# Patient Record
Sex: FEMALE | Race: WHITE
[De-identification: names, ages, dates, MRNs, and addresses within clinical notes are randomized per-mention and may not be internally consistent; named-entity substitution may affect disease eponyms.]

---

## 2017-03-17 ENCOUNTER — HOSPITAL ENCOUNTER (OUTPATIENT)
Age: 77
Discharge: HOME | End: 2017-03-17
Payer: MEDICARE

## 2017-03-17 DIAGNOSIS — D50.9: Primary | ICD-10-CM

## 2017-03-31 ENCOUNTER — HOSPITAL ENCOUNTER (OUTPATIENT)
Age: 77
Discharge: HOME | End: 2017-03-31
Payer: MEDICARE

## 2017-03-31 DIAGNOSIS — R53.83: ICD-10-CM

## 2017-03-31 DIAGNOSIS — D50.9: Primary | ICD-10-CM

## 2017-05-17 ENCOUNTER — HOSPITAL ENCOUNTER (OUTPATIENT)
Age: 77
Discharge: HOME | End: 2017-05-17
Payer: MEDICARE

## 2017-05-17 DIAGNOSIS — Z12.31: Primary | ICD-10-CM

## 2017-05-17 DIAGNOSIS — Z80.3: ICD-10-CM

## 2017-06-05 ENCOUNTER — HOSPITAL ENCOUNTER (OUTPATIENT)
Dept: HOSPITAL 76 - LAB.WCP | Age: 77
Discharge: HOME | End: 2017-06-05
Attending: FAMILY MEDICINE
Payer: MEDICARE

## 2017-06-05 DIAGNOSIS — I10: ICD-10-CM

## 2017-06-05 DIAGNOSIS — D50.9: ICD-10-CM

## 2017-06-05 DIAGNOSIS — I48.91: Primary | ICD-10-CM

## 2017-06-05 LAB
ALBUMIN/GLOB SERPL: 1.3 {RATIO} (ref 1–2.2)
ANION GAP SERPL CALCULATED.4IONS-SCNC: 8 MMOL/L (ref 6–13)
BASOPHILS NFR BLD AUTO: 0 10^3/UL (ref 0–0.1)
BASOPHILS NFR BLD AUTO: 0.7 %
BILIRUB BLD-MCNC: 0.6 MG/DL (ref 0.2–1)
BUN SERPL-MCNC: 18 MG/DL (ref 6–20)
CALCIUM UR-MCNC: 8.7 MG/DL (ref 8.5–10.3)
CHLORIDE SERPL-SCNC: 102 MMOL/L (ref 101–111)
CHOLEST SERPL-MCNC: 200 MG/DL
CO2 SERPL-SCNC: 28 MMOL/L (ref 21–32)
CREAT SERPLBLD-SCNC: 1 MG/DL (ref 0.4–1)
EOSINOPHIL # BLD AUTO: 0.1 10^3/UL (ref 0–0.7)
EOSINOPHIL NFR BLD AUTO: 3.2 %
ERYTHROCYTE [DISTWIDTH] IN BLOOD BY AUTOMATED COUNT: 16.7 % (ref 12–15)
GFRSERPLBLD MDRD-ARVRAT: 54 ML/MIN/{1.73_M2} (ref 89–?)
GLOBULIN SER-MCNC: 3.2 G/DL (ref 2.1–4.2)
GLUCOSE SERPL-MCNC: 65 MG/DL (ref 70–100)
HCT VFR BLD AUTO: 37.7 % (ref 37–47)
HDLC SERPL-MCNC: 81 MG/DL
HDLC SERPL: 2.5 {RATIO} (ref ?–4.4)
HGB UR QL STRIP: 12.3 G/DL (ref 12–16)
IRON SERPL-MCNC: 69 UG/DL (ref 28–170)
LDLC/HDLC SERPL: 1.3 {RATIO} (ref ?–4.4)
LYMPHOCYTES # SPEC AUTO: 1.1 10^3/UL (ref 1.5–3.5)
LYMPHOCYTES NFR BLD AUTO: 27.4 %
MCH RBC QN AUTO: 30 PG (ref 27–31)
MCHC RBC AUTO-ENTMCNC: 32.5 G/DL (ref 32–36)
MCV RBC AUTO: 92.2 FL (ref 81–99)
MONOCYTES # BLD AUTO: 0.4 10^3/UL (ref 0–1)
MONOCYTES NFR BLD AUTO: 9.5 %
NEUTROPHILS # BLD AUTO: 2.4 10^3/UL (ref 1.5–6.6)
NEUTROPHILS # SNV AUTO: 4 X10^3/UL (ref 4.8–10.8)
NEUTROPHILS NFR BLD AUTO: 59.2 %
NRBC # BLD AUTO: 0.2 /100WBC
PDW BLD AUTO: 9.5 FL (ref 7.9–10.8)
POTASSIUM SERPL-SCNC: 4 MMOL/L (ref 3.5–5)
PROT SPEC-MCNC: 7.5 G/DL (ref 6.7–8.2)
RBC MAR: 4.09 10^6/UL (ref 4.2–5.4)
SODIUM SERPLBLD-SCNC: 138 MMOL/L (ref 135–145)
TIBC SERPL-MCNC: 361 UG/DL (ref 250–450)
TRANSFERRIN SERPL-MCNC: 258 MG/DL (ref 192–382)
TRIGL P FAST SERPL-MCNC: 82 MG/DL
VLDLC SERPL-SCNC: 16 MG/DL
WBC # BLD: 4 X10^3/UL

## 2017-06-05 PROCEDURE — 82728 ASSAY OF FERRITIN: CPT

## 2017-06-05 PROCEDURE — 80061 LIPID PANEL: CPT

## 2017-06-05 PROCEDURE — 85025 COMPLETE CBC W/AUTO DIFF WBC: CPT

## 2017-06-05 PROCEDURE — 83540 ASSAY OF IRON: CPT

## 2017-06-05 PROCEDURE — 36415 COLL VENOUS BLD VENIPUNCTURE: CPT

## 2017-06-05 PROCEDURE — 84466 ASSAY OF TRANSFERRIN: CPT

## 2017-06-05 PROCEDURE — 80053 COMPREHEN METABOLIC PANEL: CPT

## 2017-10-19 ENCOUNTER — HOSPITAL ENCOUNTER (OUTPATIENT)
Dept: HOSPITAL 76 - LAB.WCP | Age: 77
Discharge: HOME | End: 2017-10-19
Attending: FAMILY MEDICINE
Payer: MEDICARE

## 2017-10-19 DIAGNOSIS — K92.2: Primary | ICD-10-CM

## 2017-10-19 DIAGNOSIS — Z79.01: ICD-10-CM

## 2017-10-19 LAB
BASOPHILS NFR BLD AUTO: 0.1 10^3/UL (ref 0–0.1)
BASOPHILS NFR BLD AUTO: 1.3 %
EOSINOPHIL # BLD AUTO: 0.1 10^3/UL (ref 0–0.7)
EOSINOPHIL NFR BLD AUTO: 2.2 %
ERYTHROCYTE [DISTWIDTH] IN BLOOD BY AUTOMATED COUNT: 16.1 % (ref 12–15)
HCT VFR BLD AUTO: 38.9 % (ref 37–47)
HGB UR QL STRIP: 12.7 G/DL (ref 12–16)
INR PPP: 1.8 (ref 0.8–1.2)
IRON SERPL-MCNC: 44 UG/DL (ref 28–170)
LYMPHOCYTES # SPEC AUTO: 0.8 10^3/UL (ref 1.5–3.5)
LYMPHOCYTES NFR BLD AUTO: 20.5 %
MCH RBC QN AUTO: 30.7 PG (ref 27–31)
MCHC RBC AUTO-ENTMCNC: 32.6 G/DL (ref 32–36)
MCV RBC AUTO: 94.2 FL (ref 81–99)
MONOCYTES # BLD AUTO: 0.3 10^3/UL (ref 0–1)
MONOCYTES NFR BLD AUTO: 8.2 %
NEUTROPHILS # BLD AUTO: 2.8 10^3/UL (ref 1.5–6.6)
NEUTROPHILS # SNV AUTO: 4.1 X10^3/UL (ref 4.8–10.8)
NEUTROPHILS NFR BLD AUTO: 67.8 %
NRBC # BLD AUTO: 0.1 /100WBC
PDW BLD AUTO: 8.9 FL (ref 7.9–10.8)
PROTHROM ACT/NOR PPP: 20.1 SECS (ref 9.9–12.6)
RBC MAR: 4.13 10^6/UL (ref 4.2–5.4)
TIBC SERPL-MCNC: 330 UG/DL (ref 250–450)
TRANSFERRIN SERPL-MCNC: 236 MG/DL (ref 192–382)
WBC # BLD: 4.1 X10^3/UL

## 2017-10-19 PROCEDURE — 85610 PROTHROMBIN TIME: CPT

## 2017-10-19 PROCEDURE — 84466 ASSAY OF TRANSFERRIN: CPT

## 2017-10-19 PROCEDURE — 83540 ASSAY OF IRON: CPT

## 2017-10-19 PROCEDURE — 85025 COMPLETE CBC W/AUTO DIFF WBC: CPT

## 2017-10-19 PROCEDURE — 82728 ASSAY OF FERRITIN: CPT

## 2017-10-19 PROCEDURE — 36415 COLL VENOUS BLD VENIPUNCTURE: CPT

## 2018-03-15 ENCOUNTER — HOSPITAL ENCOUNTER (OUTPATIENT)
Dept: HOSPITAL 76 - LAB.WCP | Age: 78
Discharge: HOME | End: 2018-03-15
Attending: FAMILY MEDICINE
Payer: MEDICARE

## 2018-03-15 DIAGNOSIS — E78.5: ICD-10-CM

## 2018-03-15 DIAGNOSIS — Z79.01: ICD-10-CM

## 2018-03-15 DIAGNOSIS — D50.9: Primary | ICD-10-CM

## 2018-03-15 DIAGNOSIS — E03.9: ICD-10-CM

## 2018-03-15 LAB
ALBUMIN DIAFP-MCNC: 4.3 G/DL (ref 3.2–5.5)
ALBUMIN/GLOB SERPL: 1.7 {RATIO} (ref 1–2.2)
ALP SERPL-CCNC: 81 IU/L (ref 42–121)
ALT SERPL W P-5'-P-CCNC: 22 IU/L (ref 10–60)
ANION GAP SERPL CALCULATED.4IONS-SCNC: 9 MMOL/L (ref 6–13)
AST SERPL W P-5'-P-CCNC: 23 IU/L (ref 10–42)
BASOPHILS NFR BLD AUTO: 0 10^3/UL (ref 0–0.1)
BASOPHILS NFR BLD AUTO: 0.7 %
BILIRUB BLD-MCNC: 0.4 MG/DL (ref 0.2–1)
BUN SERPL-MCNC: 23 MG/DL (ref 6–20)
CALCIUM UR-MCNC: 8.7 MG/DL (ref 8.5–10.3)
CHLORIDE SERPL-SCNC: 103 MMOL/L (ref 101–111)
CHOLEST SERPL-MCNC: 185 MG/DL
CO2 SERPL-SCNC: 27 MMOL/L (ref 21–32)
CREAT SERPLBLD-SCNC: 1.1 MG/DL (ref 0.4–1)
EOSINOPHIL # BLD AUTO: 0.1 10^3/UL (ref 0–0.7)
EOSINOPHIL NFR BLD AUTO: 2.2 %
ERYTHROCYTE [DISTWIDTH] IN BLOOD BY AUTOMATED COUNT: 14.6 % (ref 12–15)
GFRSERPLBLD MDRD-ARVRAT: 48 ML/MIN/{1.73_M2} (ref 89–?)
GLOBULIN SER-MCNC: 2.5 G/DL (ref 2.1–4.2)
GLUCOSE SERPL-MCNC: 88 MG/DL (ref 70–100)
HDLC SERPL-MCNC: 73 MG/DL
HDLC SERPL: 2.5 {RATIO} (ref ?–4.4)
HGB UR QL STRIP: 11.7 G/DL (ref 12–16)
INR PPP: 4.3 (ref 0.8–1.2)
LDLC SERPL CALC-MCNC: 90 MG/DL
LDLC/HDLC SERPL: 1.2 {RATIO} (ref ?–4.4)
LYMPHOCYTES # SPEC AUTO: 1 10^3/UL (ref 1.5–3.5)
LYMPHOCYTES NFR BLD AUTO: 21.6 %
MCH RBC QN AUTO: 29.8 PG (ref 27–31)
MCHC RBC AUTO-ENTMCNC: 32.1 G/DL (ref 32–36)
MCV RBC AUTO: 92.9 FL (ref 81–99)
MONOCYTES # BLD AUTO: 0.3 10^3/UL (ref 0–1)
MONOCYTES NFR BLD AUTO: 7.2 %
NEUTROPHILS # BLD AUTO: 3.1 10^3/UL (ref 1.5–6.6)
NEUTROPHILS # SNV AUTO: 4.5 X10^3/UL (ref 4.8–10.8)
NEUTROPHILS NFR BLD AUTO: 68.3 %
PDW BLD AUTO: 8.8 FL (ref 7.9–10.8)
PLATELET # BLD: 237 10^3/UL (ref 130–450)
PROT SPEC-MCNC: 6.8 G/DL (ref 6.7–8.2)
PROTHROM ACT/NOR PPP: 45.8 SECS (ref 9.9–12.6)
RBC MAR: 3.93 10^6/UL (ref 4.2–5.4)
SODIUM SERPLBLD-SCNC: 139 MMOL/L (ref 135–145)
VLDLC SERPL-SCNC: 22 MG/DL

## 2018-03-15 PROCEDURE — 36415 COLL VENOUS BLD VENIPUNCTURE: CPT

## 2018-03-15 PROCEDURE — 80061 LIPID PANEL: CPT

## 2018-03-15 PROCEDURE — 83721 ASSAY OF BLOOD LIPOPROTEIN: CPT

## 2018-03-15 PROCEDURE — 80053 COMPREHEN METABOLIC PANEL: CPT

## 2018-03-15 PROCEDURE — 84443 ASSAY THYROID STIM HORMONE: CPT

## 2018-03-15 PROCEDURE — 85025 COMPLETE CBC W/AUTO DIFF WBC: CPT

## 2018-03-15 PROCEDURE — 85610 PROTHROMBIN TIME: CPT

## 2018-04-27 ENCOUNTER — HOSPITAL ENCOUNTER (OUTPATIENT)
Dept: HOSPITAL 76 - LAB.WCP | Age: 78
Discharge: HOME | End: 2018-04-27
Attending: FAMILY MEDICINE
Payer: MEDICARE

## 2018-04-27 DIAGNOSIS — D50.9: Primary | ICD-10-CM

## 2018-04-27 LAB
BASOPHILS NFR BLD AUTO: 0 10^3/UL (ref 0–0.1)
BASOPHILS NFR BLD AUTO: 1 %
EOSINOPHIL # BLD AUTO: 0.1 10^3/UL (ref 0–0.7)
EOSINOPHIL NFR BLD AUTO: 2.6 %
ERYTHROCYTE [DISTWIDTH] IN BLOOD BY AUTOMATED COUNT: 14.1 % (ref 12–15)
FERRITIN SERPL-MCNC: 70.1 NG/ML (ref 11–306.8)
HGB UR QL STRIP: 12.2 G/DL (ref 12–16)
IRON SATN MFR SERPL: 18 % (ref 20–50)
IRON SERPL-MCNC: 56 UG/DL (ref 28–170)
LYMPHOCYTES # SPEC AUTO: 0.8 10^3/UL (ref 1.5–3.5)
LYMPHOCYTES NFR BLD AUTO: 21.5 %
MCH RBC QN AUTO: 29.6 PG (ref 27–31)
MCHC RBC AUTO-ENTMCNC: 32 G/DL (ref 32–36)
MCV RBC AUTO: 92.5 FL (ref 81–99)
MONOCYTES # BLD AUTO: 0.3 10^3/UL (ref 0–1)
MONOCYTES NFR BLD AUTO: 8.6 %
NEUTROPHILS # BLD AUTO: 2.4 10^3/UL (ref 1.5–6.6)
NEUTROPHILS # SNV AUTO: 3.6 X10^3/UL (ref 4.8–10.8)
NEUTROPHILS NFR BLD AUTO: 66.3 %
PDW BLD AUTO: 9.2 FL (ref 7.9–10.8)
PLATELET # BLD: 235 10^3/UL (ref 130–450)
RBC MAR: 4.11 10^6/UL (ref 4.2–5.4)
TIBC SERPL-MCNC: 316 UG/DL (ref 250–450)
TRANSFERRIN SERPL-MCNC: 226 MG/DL (ref 192–382)
VIT B12 SERPL-MCNC: 236 PG/ML (ref 180–914)

## 2018-04-27 PROCEDURE — 36415 COLL VENOUS BLD VENIPUNCTURE: CPT

## 2018-04-27 PROCEDURE — 82728 ASSAY OF FERRITIN: CPT

## 2018-04-27 PROCEDURE — 82607 VITAMIN B-12: CPT

## 2018-04-27 PROCEDURE — 85025 COMPLETE CBC W/AUTO DIFF WBC: CPT

## 2018-04-27 PROCEDURE — 84466 ASSAY OF TRANSFERRIN: CPT

## 2018-04-27 PROCEDURE — 83540 ASSAY OF IRON: CPT

## 2018-06-06 ENCOUNTER — HOSPITAL ENCOUNTER (OUTPATIENT)
Dept: HOSPITAL 76 - LAB.WCP | Age: 78
Discharge: HOME | End: 2018-06-06
Attending: FAMILY MEDICINE
Payer: MEDICARE

## 2018-06-06 DIAGNOSIS — E03.9: ICD-10-CM

## 2018-06-06 DIAGNOSIS — D50.9: Primary | ICD-10-CM

## 2018-06-06 LAB
ALBUMIN DIAFP-MCNC: 3.7 G/DL (ref 3.2–5.5)
ALBUMIN/GLOB SERPL: 1.2 {RATIO} (ref 1–2.2)
ALP SERPL-CCNC: 90 IU/L (ref 42–121)
ALT SERPL W P-5'-P-CCNC: 23 IU/L (ref 10–60)
ANION GAP SERPL CALCULATED.4IONS-SCNC: 7 MMOL/L (ref 6–13)
AST SERPL W P-5'-P-CCNC: 28 IU/L (ref 10–42)
BASOPHILS NFR BLD AUTO: 0 10^3/UL (ref 0–0.1)
BASOPHILS NFR BLD AUTO: 1.3 %
BILIRUB BLD-MCNC: 0.5 MG/DL (ref 0.2–1)
BUN SERPL-MCNC: 15 MG/DL (ref 6–20)
CALCIUM UR-MCNC: 8.7 MG/DL (ref 8.5–10.3)
CHLORIDE SERPL-SCNC: 105 MMOL/L (ref 101–111)
CO2 SERPL-SCNC: 26 MMOL/L (ref 21–32)
CREAT SERPLBLD-SCNC: 1 MG/DL (ref 0.4–1)
EOSINOPHIL # BLD AUTO: 0.1 10^3/UL (ref 0–0.7)
EOSINOPHIL NFR BLD AUTO: 2.8 %
ERYTHROCYTE [DISTWIDTH] IN BLOOD BY AUTOMATED COUNT: 14.5 % (ref 12–15)
FERRITIN SERPL-MCNC: 52.2 NG/ML (ref 11–306.8)
GFRSERPLBLD MDRD-ARVRAT: 54 ML/MIN/{1.73_M2} (ref 89–?)
GLOBULIN SER-MCNC: 3.1 G/DL (ref 2.1–4.2)
GLUCOSE SERPL-MCNC: 84 MG/DL (ref 70–100)
HGB UR QL STRIP: 12.7 G/DL (ref 12–16)
IRON SATN MFR SERPL: 23 % (ref 20–50)
IRON SERPL-MCNC: 79 UG/DL (ref 28–170)
LYMPHOCYTES # SPEC AUTO: 1 10^3/UL (ref 1.5–3.5)
LYMPHOCYTES NFR BLD AUTO: 28.2 %
MCH RBC QN AUTO: 29.3 PG (ref 27–31)
MCHC RBC AUTO-ENTMCNC: 32.2 G/DL (ref 32–36)
MCV RBC AUTO: 90.9 FL (ref 81–99)
MONOCYTES # BLD AUTO: 0.3 10^3/UL (ref 0–1)
MONOCYTES NFR BLD AUTO: 8.9 %
NEUTROPHILS # BLD AUTO: 2 10^3/UL (ref 1.5–6.6)
NEUTROPHILS # SNV AUTO: 3.4 X10^3/UL (ref 4.8–10.8)
NEUTROPHILS NFR BLD AUTO: 58.8 %
PDW BLD AUTO: 9.1 FL (ref 7.9–10.8)
PLATELET # BLD: 227 10^3/UL (ref 130–450)
PROT SPEC-MCNC: 6.8 G/DL (ref 6.7–8.2)
RBC MAR: 4.34 10^6/UL (ref 4.2–5.4)
SODIUM SERPLBLD-SCNC: 138 MMOL/L (ref 135–145)
TIBC SERPL-MCNC: 340 UG/DL (ref 250–450)
TRANSFERRIN SERPL-MCNC: 243 MG/DL (ref 192–382)
TSH SERPL-ACNC: 0.8 UIU/ML (ref 0.34–5.6)
VIT B12 SERPL-MCNC: 195 PG/ML (ref 180–914)

## 2018-06-06 PROCEDURE — 36415 COLL VENOUS BLD VENIPUNCTURE: CPT

## 2018-06-06 PROCEDURE — 85025 COMPLETE CBC W/AUTO DIFF WBC: CPT

## 2018-06-06 PROCEDURE — 80053 COMPREHEN METABOLIC PANEL: CPT

## 2018-06-06 PROCEDURE — 82728 ASSAY OF FERRITIN: CPT

## 2018-06-06 PROCEDURE — 82607 VITAMIN B-12: CPT

## 2018-06-06 PROCEDURE — 83540 ASSAY OF IRON: CPT

## 2018-06-06 PROCEDURE — 84443 ASSAY THYROID STIM HORMONE: CPT

## 2018-06-06 PROCEDURE — 84466 ASSAY OF TRANSFERRIN: CPT

## 2018-08-01 ENCOUNTER — HOSPITAL ENCOUNTER (OUTPATIENT)
Dept: HOSPITAL 76 - DI.N | Age: 78
Discharge: HOME | End: 2018-08-01
Attending: RADIOLOGY
Payer: MEDICARE

## 2018-08-01 DIAGNOSIS — Z12.31: Primary | ICD-10-CM

## 2018-08-01 DIAGNOSIS — Z80.3: ICD-10-CM

## 2018-08-01 DIAGNOSIS — Z98.82: ICD-10-CM

## 2018-08-01 PROCEDURE — 77067 SCR MAMMO BI INCL CAD: CPT

## 2018-08-03 NOTE — MAMMOGRAPHY REPORT
Procedure Date:  08/01/2018   

Accession Number:  [id number] / [id number]                    

Procedure:  MGN - Screening Mammo Dig w/Implants CPT Code:  

 

FULL RESULT:

 

 

EXAM: Screening Mammo Dig w/Implants

 

DATE: 8/1/2018 3:30 PM

 

CLINICAL HISTORY: 77-year-old female with family history of breast cancer 

in her daughter at age 40 and a cousin at age 49 with current left breast 

saline implant presents for screening mammography.

 

TECHNIQUE: Bilateral CC and MLO views were obtained. Additionally, right 

adnexa CCL, left CC implant displaced and left MLO implant displaced 

views were obtained.

 

COMPARISON: 5/17/2017, 5/12/2016, 2/17/2015, 6/3/2013.

 

FINDINGS:

The breasts demonstrate heterogeneously dense fibroglandular parenchyma 

bilaterally. The left breast implant appears stable. There are coarse 

typically benign calcifications. No suspicious masses, clustered 

microcalcifications, or regions of architectural distortion are 

identified.

 

IMPRESSION: Benign findings

 

RECOMMENDATION: Routine annual screening unless otherwise clinically 

indicated.

 

BIRADS CATEGORY 2: Benign findings

 

STANDARD QUALIFYING STATEMENTS:

1.  This examination was reviewed with the aid of Computer-Aided 

Detection (CAD).

2.  A negative or benign  imaging report should not delay biopsy if 

clinically suspicious findings are present.  Consider surgical 

consultation if warrented.  More than 5% of cancers are not identified by 

imaging.

3.  Dense breasts may obscure an underlying neoplasm.

## 2018-10-31 ENCOUNTER — HOSPITAL ENCOUNTER (OUTPATIENT)
Dept: HOSPITAL 76 - DI | Age: 78
Discharge: HOME | End: 2018-10-31
Attending: FAMILY MEDICINE
Payer: MEDICARE

## 2018-10-31 DIAGNOSIS — S83.252A: ICD-10-CM

## 2018-10-31 DIAGNOSIS — M17.12: Primary | ICD-10-CM

## 2018-10-31 DIAGNOSIS — M23.42: ICD-10-CM

## 2018-10-31 DIAGNOSIS — D17.0: ICD-10-CM

## 2018-10-31 DIAGNOSIS — M22.8X2: ICD-10-CM

## 2018-10-31 DIAGNOSIS — M25.462: ICD-10-CM

## 2018-10-31 PROCEDURE — 76536 US EXAM OF HEAD AND NECK: CPT

## 2018-10-31 NOTE — ULTRASOUND REPORT
Reason:  KNEE PAIN, LEFT, BONE SOFT TISSUE   SKIN NEOPLASM

Procedure Date:  10/31/2018   

Accession Number:  [id number] / [id number]                    

Procedure:  US  - Head or Neck Soft Tissue CPT Code:  

 

FULL RESULT:

 

 

EXAM:

NECK ULTRASOUND

 

EXAM DATE: 10/31/2018 06:47 PM.

 

CLINICAL HISTORY:  SOFT TISSUE   SKIN NEOPLASM.

 

COMPARISON: None.

 

TECHNIQUE: Real-time sonographic imaging was performed by the sonographer 

of the palpable abnormality identified by the patient. Multiple 

representative static images were saved for review.

 

FINDINGS: At the site of palpable abnormality, there is a 2.4 x 1.6 x 0.4 

cm echogenic nodule, compatible with a lipoma. No sonographically 

suspicious findings are identified.

IMPRESSION: Subcutaneous lipoma, correlating with the palpable 

abnormality.

 

[facility]

## 2018-11-01 NOTE — MRI REPORT
Reason:  KNEE PAIN, LEFT, BONE SOFT TISSUE   SKIN NEOPLASM

Procedure Date:  10/31/2018   

Accession Number:  [id number] / [id number]                    

Procedure:  MRI - Knee LT W/O CPT Code:  

 

FULL RESULT:

 

 

EXAM:

LEFT KNEE MRI WITHOUT CONTRAST

 

EXAM DATE: 10/31/2018 05:58 PM.

 

CLINICAL HISTORY: Left knee pain.

 

COMPARISON: KNEE STANDING LT 09/30/2016 11:02 AM.

 

TECHNIQUE: Multiplanar, multisequence T1-weighted and fluid-sensitive 

sequences of the knee without contrast. Other: None.

 

FINDINGS:

Bones and Articular Cartilage: Grade III chondromalacia at the 

posterolateral aspect of the medial femoral condyle. Grade IV 

chondromalacia, slight cortical irregularity, and subchondral marrow 

edema at the lateral tibial plateau. Grade III-IV chondromalacia and mild 

subchondral marrow edema at the lateral femoral condyle. Small marginal 

osteophytes at the femoral condyles, tibial plateau, and patella. Grade 

II-III chondromalacia at the median ridge and medial facet of the 

patella. Patella lauryn is present.

 

Medial Meniscus: Minimal free edge fraying at the posterior horn and body.

 

Lateral Meniscus: Complex bucket-handle tear of the lateral meniscus. 

Part of the lateral meniscus is displaced medially within the medial 

aspect of the lateral joint compartment. There are also oblique and 

horizontal tears at the posterior horn and body.

 

Cruciate Ligaments: The anterior and posterior cruciate ligaments are 

intact.

 

Collateral Ligaments: The medial collateral and lateral collateral 

ligamentous structures are intact.

 

Tendons: The quadriceps, patellar, semimembranosus, and popliteus tendons 

are unremarkable.

 

Musculature: No edema or fatty atrophy.

 

Other: Small joint effusion. Small to moderate-sized popliteal cyst. 

There are 2, small, subcentimeter loose bodies at the posterior lateral 

aspect of the medial joint compartment which measure approximately 3 mm.  

The medial and lateral retinacula are intact. The subcutaneous tissues 

and fat pads are unremarkable.

IMPRESSION:

1. 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 

lateral compartment.

2. Patella lauryn is present.

3. Minimal free edge fraying at the posterior horn and body of the medial 

meniscus.

4. Complex bucket-handle tear of the lateral meniscus. There are also 

oblique and horizontal tears of the posterior horn lateral meniscus.

5. Small joint effusion. Small to moderate-sized popliteal cyst.

6. Small subcentimeter loose bodies within the posterior lateral aspect 

of the medial joint compartment.

 

[facility] MUSCULOSKELETAL RADIOLOGY SECTION

## 2019-01-12 ENCOUNTER — HOSPITAL ENCOUNTER (OUTPATIENT)
Dept: HOSPITAL 76 - DI | Age: 79
Discharge: HOME | End: 2019-01-12
Attending: FAMILY MEDICINE
Payer: MEDICARE

## 2019-01-12 DIAGNOSIS — I27.20: ICD-10-CM

## 2019-01-12 DIAGNOSIS — I35.1: ICD-10-CM

## 2019-01-12 DIAGNOSIS — I34.0: Primary | ICD-10-CM

## 2019-01-12 PROCEDURE — 93306 TTE W/DOPPLER COMPLETE: CPT

## 2019-03-25 ENCOUNTER — HOSPITAL ENCOUNTER (OUTPATIENT)
Dept: HOSPITAL 76 - LAB.WCP | Age: 79
Discharge: HOME | End: 2019-03-25
Attending: FAMILY MEDICINE
Payer: MEDICARE

## 2019-03-25 DIAGNOSIS — Z79.01: ICD-10-CM

## 2019-03-25 DIAGNOSIS — I48.91: Primary | ICD-10-CM

## 2019-07-19 ENCOUNTER — HOSPITAL ENCOUNTER (OUTPATIENT)
Dept: HOSPITAL 76 - LAB.WCP | Age: 79
Discharge: HOME | End: 2019-07-19
Attending: FAMILY MEDICINE
Payer: MEDICARE

## 2019-07-19 DIAGNOSIS — Z79.01: ICD-10-CM

## 2019-07-19 DIAGNOSIS — I48.91: Primary | ICD-10-CM

## 2019-08-14 ENCOUNTER — HOSPITAL ENCOUNTER (OUTPATIENT)
Dept: HOSPITAL 76 - LAB.WCP | Age: 79
End: 2019-08-14
Attending: FAMILY MEDICINE
Payer: MEDICARE

## 2019-08-14 DIAGNOSIS — D50.9: Primary | ICD-10-CM

## 2019-08-14 DIAGNOSIS — E78.5: ICD-10-CM

## 2019-08-14 LAB
ALBUMIN DIAFP-MCNC: 3.8 G/DL (ref 3.2–5.5)
ALBUMIN/GLOB SERPL: 1.2 {RATIO} (ref 1–2.2)
ALP SERPL-CCNC: 154 IU/L (ref 42–121)
ALT SERPL W P-5'-P-CCNC: 71 IU/L (ref 10–60)
ANION GAP SERPL CALCULATED.4IONS-SCNC: 11 MMOL/L (ref 6–13)
AST SERPL W P-5'-P-CCNC: 41 IU/L (ref 10–42)
BASOPHILS NFR BLD AUTO: 0 10^3/UL (ref 0–0.1)
BASOPHILS NFR BLD AUTO: 0.7 %
BILIRUB BLD-MCNC: 0.4 MG/DL (ref 0.2–1)
BUN SERPL-MCNC: 16 MG/DL (ref 6–20)
CALCIUM UR-MCNC: 8.7 MG/DL (ref 8.5–10.3)
CHLORIDE SERPL-SCNC: 100 MMOL/L (ref 101–111)
CHOLEST SERPL-MCNC: 193 MG/DL
CO2 SERPL-SCNC: 27 MMOL/L (ref 21–32)
CREAT SERPLBLD-SCNC: 1.1 MG/DL (ref 0.4–1)
EOSINOPHIL # BLD AUTO: 0.2 10^3/UL (ref 0–0.7)
EOSINOPHIL NFR BLD AUTO: 3.5 %
ERYTHROCYTE [DISTWIDTH] IN BLOOD BY AUTOMATED COUNT: 14.2 % (ref 12–15)
FERRITIN SERPL-MCNC: 16.4 NG/ML (ref 11–306.8)
GFRSERPLBLD MDRD-ARVRAT: 48 ML/MIN/{1.73_M2} (ref 89–?)
GLOBULIN SER-MCNC: 3.2 G/DL (ref 2.1–4.2)
GLUCOSE SERPL-MCNC: 93 MG/DL (ref 70–100)
HDLC SERPL-MCNC: 76 MG/DL
HDLC SERPL: 2.5 {RATIO} (ref ?–4.4)
HGB UR QL STRIP: 11.4 G/DL (ref 12–16)
IRON SATN MFR SERPL: 9 % (ref 20–50)
IRON SERPL-MCNC: 40 UG/DL (ref 28–170)
LDLC SERPL CALC-MCNC: 98 MG/DL
LDLC/HDLC SERPL: 1.3 {RATIO} (ref ?–4.4)
LYMPHOCYTES # SPEC AUTO: 1.4 10^3/UL (ref 1.5–3.5)
LYMPHOCYTES NFR BLD AUTO: 30.2 %
MCH RBC QN AUTO: 28.4 PG (ref 27–31)
MCHC RBC AUTO-ENTMCNC: 30.5 G/DL (ref 32–36)
MCV RBC AUTO: 93.3 FL (ref 81–99)
MONOCYTES # BLD AUTO: 0.5 10^3/UL (ref 0–1)
MONOCYTES NFR BLD AUTO: 11 %
NEUTROPHILS # BLD AUTO: 2.5 10^3/UL (ref 1.5–6.6)
NEUTROPHILS # SNV AUTO: 4.5 X10^3/UL (ref 4.8–10.8)
NEUTROPHILS NFR BLD AUTO: 54.6 %
PDW BLD AUTO: 10.5 FL (ref 7.9–10.8)
PLATELET # BLD: 269 10^3/UL (ref 130–450)
PROT SPEC-MCNC: 7 G/DL (ref 6.7–8.2)
RBC MAR: 4.01 10^6/UL (ref 4.2–5.4)
SODIUM SERPLBLD-SCNC: 138 MMOL/L (ref 135–145)
TIBC SERPL-MCNC: 424 UG/DL (ref 250–450)
TRANSFERRIN SERPL-MCNC: 303 MG/DL (ref 192–382)
VIT B12 SERPL-MCNC: 301 PG/ML (ref 180–914)
VLDLC SERPL-SCNC: 19 MG/DL

## 2019-08-14 PROCEDURE — 36415 COLL VENOUS BLD VENIPUNCTURE: CPT

## 2019-08-14 PROCEDURE — 82728 ASSAY OF FERRITIN: CPT

## 2019-08-14 PROCEDURE — 83721 ASSAY OF BLOOD LIPOPROTEIN: CPT

## 2019-08-14 PROCEDURE — 84466 ASSAY OF TRANSFERRIN: CPT

## 2019-08-14 PROCEDURE — 83540 ASSAY OF IRON: CPT

## 2019-08-14 PROCEDURE — 80053 COMPREHEN METABOLIC PANEL: CPT

## 2019-08-14 PROCEDURE — 82607 VITAMIN B-12: CPT

## 2019-08-14 PROCEDURE — 80061 LIPID PANEL: CPT

## 2019-08-14 PROCEDURE — 85025 COMPLETE CBC W/AUTO DIFF WBC: CPT

## 2019-08-14 PROCEDURE — 84443 ASSAY THYROID STIM HORMONE: CPT

## 2019-08-19 ENCOUNTER — HOSPITAL ENCOUNTER (OUTPATIENT)
Dept: HOSPITAL 76 - DI.N | Age: 79
Discharge: HOME | End: 2019-08-19
Attending: GENERAL ACUTE CARE HOSPITAL
Payer: MEDICARE

## 2019-08-19 DIAGNOSIS — Z12.31: Primary | ICD-10-CM

## 2019-08-19 DIAGNOSIS — Z80.3: ICD-10-CM

## 2019-08-19 PROCEDURE — 77067 SCR MAMMO BI INCL CAD: CPT

## 2019-08-21 ENCOUNTER — HOSPITAL ENCOUNTER (OUTPATIENT)
Dept: HOSPITAL 76 - LAB.WCP | Age: 79
Discharge: HOME | End: 2019-08-21
Attending: FAMILY MEDICINE
Payer: MEDICARE

## 2019-08-21 DIAGNOSIS — Z79.01: ICD-10-CM

## 2019-08-21 DIAGNOSIS — I48.91: Primary | ICD-10-CM

## 2019-08-22 ENCOUNTER — HOSPITAL ENCOUNTER (OUTPATIENT)
Dept: HOSPITAL 76 - DI | Age: 79
Discharge: HOME | End: 2019-08-22
Attending: FAMILY MEDICINE
Payer: MEDICARE

## 2019-08-22 DIAGNOSIS — Z78.0: Primary | ICD-10-CM

## 2019-08-22 PROCEDURE — 77080 DXA BONE DENSITY AXIAL: CPT

## 2019-08-22 PROCEDURE — 77081 DXA BONE DENSITY APPENDICULR: CPT

## 2019-08-28 ENCOUNTER — HOSPITAL ENCOUNTER (OUTPATIENT)
Dept: HOSPITAL 76 - LAB.WCP | Age: 79
Discharge: HOME | End: 2019-08-28
Attending: FAMILY MEDICINE
Payer: MEDICARE

## 2019-08-28 DIAGNOSIS — I48.0: Primary | ICD-10-CM

## 2019-08-28 DIAGNOSIS — Z79.01: ICD-10-CM

## 2019-08-28 NOTE — DEXA REPORT
Reason:  POST MENOPAUSAL STATUS

Procedure Date:  08/22/2019   

Accession Number:  [id number] / [id number]                    

Procedure:  DEX - Dexa Forearm CPT Code:  

 

FULL RESULT:

 

 

EXAM: Dexa Spine and/or Hip, Dexa Forearm

 

DATE: 8/22/2019 3:52 PM

 

CLINICAL HISTORY: POST MENOPAUSAL STATUS

 

TECHNIQUE: Dual energy x-ray absorptiometry (DXA) was performed on a [employer] System.  Regions measured are the AP Spine, femoral neck, and if 

needed forearm.

 

COMPARISON: None.

 

In accordance with the International Society for Clinical Densitometry 

(ISCD) guidelines, data from previous exams may be reanalyzed using 

current recommendations and techniques.  This is done to allow a more 

accurate basis for comparison with the current study.

 

FINDINGS:

The data for the hip is as follows:

 

                 BMD (g/cm/cm)         T-SCORE          Z-SCORE

 REGION

 Neck             1.004                       -0.2              1.8

TOTAL            0.906                       -0.8            1.1

 

NOTE: The femoral neck or total proximal femur, whichever is lowest, is 

used for classification.

 

The data for the left forearm is as follows:

 

                   BMD (g/cm/cm)         T-SCORE          Z-SCORE

 REGION

          1/3       0.806    -0.8    1.7

 

NOTE:  The 33% radius of the nondominant forearm is used for 

classification.

 

IMPRESSION: THE WHO CLASSIFICATION BASED ON THE INTERNATIONAL REFERENCE 

STANDARD IS NORMAL.  THE FRACTURE RISK IS NOT INCREASED.

 

RECOMMENDATION: Patients with diagnosis of osteoporosis or osteopenia 

should have regular bone mineral density assessment.  For those eligible 

for Medicare, routine testing is allowed once every 2 years.  Testing 

frequency can be increased for patients who have rapidly progressing 

disease or for those who are receiving medical therapy to restore bone 

mass.

 

COMMENT:  World Health Organization (WHO) definitions for osteoporosis 

and osteopenia:

NORMAL BMD:  T-score at -1.0 or higher, fracture risk is low

OSTEOPENIA BMD:  T-score between -1.0 and -2.5, fracture risk is 

increased.

OSTEOPOROSIS BMD:  T-score at -2.5 or lower, fracture risk is high.

 

National Osteoporosis Foundation recommends:

1. Obtain adequate dietary calcium (at least 1200 mg per day) and vitamin 

D (400-800 international units per day).

2. Participate, as appropriate, in regular weightbearing and 

muscle-strengthening exercise.

3. Avoid tobacco use and reduce alcohol and caffeine intake.

4. For more detailed information see the website at www.NOF.org.

## 2019-08-28 NOTE — DEXA REPORT
Reason:  POST MENOPAUSAL STATUS

Procedure Date:  08/22/2019   

Accession Number:  [id number] / [id number]                    

Procedure:  DEX - Dexa Spine and/or Hip CPT Code:  

 

FULL RESULT:

 

 

EXAM: Dexa Spine and/or Hip, Dexa Forearm

 

DATE: 8/22/2019 3:52 PM

 

CLINICAL HISTORY: POST MENOPAUSAL STATUS

 

TECHNIQUE: Dual energy x-ray absorptiometry (DXA) was performed on a [employer] System.  Regions measured are the AP Spine, femoral neck, and if 

needed forearm.

 

COMPARISON: None.

 

In accordance with the International Society for Clinical Densitometry 

(ISCD) guidelines, data from previous exams may be reanalyzed using 

current recommendations and techniques.  This is done to allow a more 

accurate basis for comparison with the current study.

 

FINDINGS:

The data for the hip is as follows:

 

                 BMD (g/cm/cm)         T-SCORE          Z-SCORE

 REGION

 Neck             1.004                       -0.2              1.8

TOTAL            0.906                       -0.8            1.1

 

NOTE: The femoral neck or total proximal femur, whichever is lowest, is 

used for classification.

 

The data for the left forearm is as follows:

 

                   BMD (g/cm/cm)         T-SCORE          Z-SCORE

 REGION

          1/3       0.806    -0.8    1.7

 

NOTE:  The 33% radius of the nondominant forearm is used for 

classification.

 

IMPRESSION: THE WHO CLASSIFICATION BASED ON THE INTERNATIONAL REFERENCE 

STANDARD IS NORMAL.  THE FRACTURE RISK IS NOT INCREASED.

 

RECOMMENDATION: Patients with diagnosis of osteoporosis or osteopenia 

should have regular bone mineral density assessment.  For those eligible 

for Medicare, routine testing is allowed once every 2 years.  Testing 

frequency can be increased for patients who have rapidly progressing 

disease or for those who are receiving medical therapy to restore bone 

mass.

 

COMMENT:  World Health Organization (WHO) definitions for osteoporosis 

and osteopenia:

NORMAL BMD:  T-score at -1.0 or higher, fracture risk is low

OSTEOPENIA BMD:  T-score between -1.0 and -2.5, fracture risk is 

increased.

OSTEOPOROSIS BMD:  T-score at -2.5 or lower, fracture risk is high.

 

National Osteoporosis Foundation recommends:

1. Obtain adequate dietary calcium (at least 1200 mg per day) and vitamin 

D (400-800 international units per day).

2. Participate, as appropriate, in regular weightbearing and 

muscle-strengthening exercise.

3. Avoid tobacco use and reduce alcohol and caffeine intake.

4. For more detailed information see the website at www.NOF.org.

## 2019-09-27 ENCOUNTER — HOSPITAL ENCOUNTER (OUTPATIENT)
Dept: HOSPITAL 76 - LAB.WCP | Age: 79
Discharge: HOME | End: 2019-09-27
Attending: FAMILY MEDICINE
Payer: MEDICARE

## 2019-09-27 DIAGNOSIS — I48.91: ICD-10-CM

## 2019-09-27 DIAGNOSIS — Z79.01: Primary | ICD-10-CM

## 2019-10-17 ENCOUNTER — HOSPITAL ENCOUNTER (OUTPATIENT)
Dept: HOSPITAL 76 - LAB.WCP | Age: 79
Discharge: HOME | End: 2019-10-17
Attending: FAMILY MEDICINE
Payer: MEDICARE

## 2019-10-17 DIAGNOSIS — I48.91: ICD-10-CM

## 2019-10-17 DIAGNOSIS — Z79.01: Primary | ICD-10-CM

## 2019-11-07 ENCOUNTER — HOSPITAL ENCOUNTER (OUTPATIENT)
Dept: HOSPITAL 76 - LAB.WCP | Age: 79
Discharge: HOME | End: 2019-11-07
Attending: FAMILY MEDICINE
Payer: MEDICARE

## 2019-11-07 DIAGNOSIS — Z79.01: Primary | ICD-10-CM

## 2019-11-07 DIAGNOSIS — I48.91: ICD-10-CM

## 2019-11-25 ENCOUNTER — HOSPITAL ENCOUNTER (OUTPATIENT)
Dept: HOSPITAL 76 - LAB.WCP | Age: 79
Discharge: HOME | End: 2019-11-25
Attending: FAMILY MEDICINE
Payer: MEDICARE

## 2019-11-25 DIAGNOSIS — Z79.01: Primary | ICD-10-CM

## 2019-11-25 DIAGNOSIS — I48.91: ICD-10-CM

## 2019-12-05 ENCOUNTER — HOSPITAL ENCOUNTER (OUTPATIENT)
Dept: HOSPITAL 76 - LAB.WCP | Age: 79
Discharge: HOME | End: 2019-12-05
Attending: FAMILY MEDICINE
Payer: MEDICARE

## 2019-12-05 DIAGNOSIS — Z79.01: Primary | ICD-10-CM

## 2019-12-05 DIAGNOSIS — I48.91: ICD-10-CM

## 2019-12-19 ENCOUNTER — HOSPITAL ENCOUNTER (OUTPATIENT)
Dept: HOSPITAL 76 - LAB.WCP | Age: 79
Discharge: HOME | End: 2019-12-19
Attending: FAMILY MEDICINE
Payer: MEDICARE

## 2019-12-19 DIAGNOSIS — I48.91: ICD-10-CM

## 2019-12-19 DIAGNOSIS — Z79.01: Primary | ICD-10-CM

## 2020-01-16 ENCOUNTER — HOSPITAL ENCOUNTER (OUTPATIENT)
Dept: HOSPITAL 76 - LAB.WCP | Age: 80
Discharge: HOME | End: 2020-01-16
Attending: FAMILY MEDICINE
Payer: MEDICARE

## 2020-01-16 DIAGNOSIS — I48.91: ICD-10-CM

## 2020-01-16 DIAGNOSIS — Z79.01: Primary | ICD-10-CM

## 2020-01-30 ENCOUNTER — HOSPITAL ENCOUNTER (OUTPATIENT)
Dept: HOSPITAL 76 - LAB.WCP | Age: 80
Discharge: HOME | End: 2020-01-30
Attending: FAMILY MEDICINE
Payer: MEDICARE

## 2020-01-30 DIAGNOSIS — I48.91: ICD-10-CM

## 2020-01-30 DIAGNOSIS — Z79.01: ICD-10-CM

## 2020-01-30 DIAGNOSIS — I34.0: Primary | ICD-10-CM

## 2020-03-06 ENCOUNTER — HOSPITAL ENCOUNTER (OUTPATIENT)
Dept: HOSPITAL 76 - LAB.WCP | Age: 80
Discharge: HOME | End: 2020-03-06
Attending: FAMILY MEDICINE
Payer: MEDICARE

## 2020-03-06 DIAGNOSIS — Z79.01: ICD-10-CM

## 2020-03-06 DIAGNOSIS — I48.91: Primary | ICD-10-CM

## 2020-04-14 ENCOUNTER — HOSPITAL ENCOUNTER (OUTPATIENT)
Dept: HOSPITAL 76 - LAB.WCP | Age: 80
Discharge: HOME | End: 2020-04-14
Attending: FAMILY MEDICINE
Payer: MEDICARE

## 2020-04-14 DIAGNOSIS — I48.91: Primary | ICD-10-CM

## 2020-04-14 DIAGNOSIS — Z79.01: ICD-10-CM

## 2020-04-22 ENCOUNTER — HOSPITAL ENCOUNTER (OUTPATIENT)
Dept: HOSPITAL 76 - LAB.WCP | Age: 80
Discharge: HOME | End: 2020-04-22
Attending: FAMILY MEDICINE
Payer: MEDICARE

## 2020-04-22 DIAGNOSIS — E03.9: ICD-10-CM

## 2020-04-22 DIAGNOSIS — D50.9: Primary | ICD-10-CM

## 2020-04-22 LAB
BASOPHILS NFR BLD AUTO: 0 10^3/UL (ref 0–0.1)
BASOPHILS NFR BLD AUTO: 0.7 %
EOSINOPHIL # BLD AUTO: 0.2 10^3/UL (ref 0–0.7)
EOSINOPHIL NFR BLD AUTO: 3.8 %
ERYTHROCYTE [DISTWIDTH] IN BLOOD BY AUTOMATED COUNT: 14.6 % (ref 12–15)
HGB UR QL STRIP: 11.5 G/DL (ref 12–16)
LYMPHOCYTES # SPEC AUTO: 1.3 10^3/UL (ref 1.5–3.5)
LYMPHOCYTES NFR BLD AUTO: 28.8 %
MCH RBC QN AUTO: 29 PG (ref 27–31)
MCHC RBC AUTO-ENTMCNC: 30.3 G/DL (ref 32–36)
MCV RBC AUTO: 95.7 FL (ref 81–99)
MONOCYTES # BLD AUTO: 0.4 10^3/UL (ref 0–1)
MONOCYTES NFR BLD AUTO: 8.4 %
NEUTROPHILS # BLD AUTO: 2.6 10^3/UL (ref 1.5–6.6)
NEUTROPHILS # SNV AUTO: 4.5 X10^3/UL (ref 4.8–10.8)
NEUTROPHILS NFR BLD AUTO: 58.1 %
PDW BLD AUTO: 11.9 FL (ref 7.9–10.8)
PLATELET # BLD: 213 10^3/UL (ref 130–450)
RBC MAR: 3.96 10^6/UL (ref 4.2–5.4)

## 2020-04-22 PROCEDURE — 36415 COLL VENOUS BLD VENIPUNCTURE: CPT

## 2020-04-22 PROCEDURE — 85025 COMPLETE CBC W/AUTO DIFF WBC: CPT

## 2020-04-22 PROCEDURE — 80053 COMPREHEN METABOLIC PANEL: CPT

## 2020-04-22 PROCEDURE — 84443 ASSAY THYROID STIM HORMONE: CPT

## 2020-06-25 ENCOUNTER — HOSPITAL ENCOUNTER (OUTPATIENT)
Dept: HOSPITAL 76 - LAB.WCP | Age: 80
Discharge: HOME | End: 2020-06-25
Attending: FAMILY MEDICINE
Payer: MEDICARE

## 2020-06-25 DIAGNOSIS — I48.91: Primary | ICD-10-CM

## 2020-06-25 DIAGNOSIS — Z79.01: ICD-10-CM

## 2020-08-14 ENCOUNTER — HOSPITAL ENCOUNTER (OUTPATIENT)
Dept: HOSPITAL 76 - LAB.WCP | Age: 80
Discharge: HOME | End: 2020-08-14
Attending: FAMILY MEDICINE
Payer: MEDICARE

## 2020-08-14 DIAGNOSIS — I48.91: Primary | ICD-10-CM

## 2020-08-14 DIAGNOSIS — Z79.01: ICD-10-CM

## 2020-08-17 ENCOUNTER — HOSPITAL ENCOUNTER (OUTPATIENT)
Dept: HOSPITAL 76 - DI | Age: 80
Discharge: HOME | End: 2020-08-17
Attending: FAMILY MEDICINE
Payer: MEDICARE

## 2020-08-17 DIAGNOSIS — E04.1: ICD-10-CM

## 2020-08-17 DIAGNOSIS — R91.8: ICD-10-CM

## 2020-08-17 DIAGNOSIS — R91.1: Primary | ICD-10-CM

## 2020-08-17 PROCEDURE — 71250 CT THORAX DX C-: CPT

## 2020-08-17 NOTE — CT REPORT
PROCEDURE:  CHEST WO

 

INDICATIONS:  ZENKERS DIVERTICULUM

 

TECHNIQUE: 

Noncontrast 5 mm thick sections acquired from the pulmonary apices to the posterior costophrenic angl
es.  7 mm thick coronal and sagittal MIP reformats were then acquired.  For radiation dose reduction,
 the following was used:  automated exposure control, adjustment of mA and/or kV according to patient
 size.

 

COMPARISON:  CXR 1/3/2019.

 

FINDINGS:  

Image quality:  Excellent.  

 

Lungs and pleura: Mild mosaic attenuation bilaterally. 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
ectasis. Lower lobe reticulation. Left upper lobe nodular opacity measuring 6 mm mean diameter, (4/12
4).  No pleural effusions or pneumothorax.  Central and peripheral airways are patent and normal in c
aliber.  

 

Mediastinum:  Heart size is normal. Trace pericardial fluid.  No mediastinal adenopathy by size crite
deborah.  Thoracic aorta and central pulmonary arteries are normal in size. Minimal secretions in the upp
er trachea. No discrete diverticulum is seen. The mid trachea is minimally patulous. Distal trachea i
s unremarkable. No hiatal hernia.  

 

Bones and chest wall:  No suspicious bony lesions.  No vertebral body compression fractures.  No axil
gilma or supraclavicular adenopathy by size criteria. Right breast prosthesis. Right thyroid nodule me
asuring 2 cm, (9/19). 

 

Abdomen:  Visualized upper abdominal solid organs and bowel loops appear normal in the absence of con
trast.  

 

IMPRESSION:  

1. No upper esophageal diverticulum identified.

-Consider evaluation with esophagram.

 

2. Nodular opacity in the left upper lobe with a mean diameter of 6 mm.

-Recommend further evaluation with CT in 6-12 months depending on risk factors.

 

3. Nonspecific mild mosaic attenuation of the lung parenchyma. This could be seen in pulmonary edema 
or air trapping among other etiologies. 

 

4. Hypodense right thyroid nodule measuring 2 cm.

-This can be further evaluated with thyroid ultrasound.

 

Reviewed by: Jose Perez MD on 8/17/2020 1:30 PM PDT

Approved by: Jose Perez MD on 8/17/2020 1:30 PM PDT

 

 

Station ID:  [id number]

## 2020-09-27 ENCOUNTER — HOSPITAL ENCOUNTER (EMERGENCY)
Dept: HOSPITAL 76 - ED | Age: 80
Discharge: HOME | End: 2020-09-27
Payer: MEDICARE

## 2020-09-27 DIAGNOSIS — Z79.01: ICD-10-CM

## 2020-09-27 DIAGNOSIS — R10.84: ICD-10-CM

## 2020-09-27 DIAGNOSIS — I48.20: ICD-10-CM

## 2020-09-27 DIAGNOSIS — K59.00: Primary | ICD-10-CM

## 2020-09-27 DIAGNOSIS — R51: ICD-10-CM

## 2020-09-27 LAB
ALBUMIN DIAFP-MCNC: 4 G/DL (ref 3.2–5.5)
ALBUMIN/GLOB SERPL: 1.1 {RATIO} (ref 1–2.2)
ALP SERPL-CCNC: 130 IU/L (ref 42–121)
ALT SERPL W P-5'-P-CCNC: 19 IU/L (ref 10–60)
ANION GAP SERPL CALCULATED.4IONS-SCNC: 10 MMOL/L (ref 6–13)
AST SERPL W P-5'-P-CCNC: 28 IU/L (ref 10–42)
BASOPHILS NFR BLD AUTO: 0 10^3/UL (ref 0–0.1)
BASOPHILS NFR BLD AUTO: 0.5 %
BILIRUB BLD-MCNC: 0.7 MG/DL (ref 0.2–1)
BUN SERPL-MCNC: 16 MG/DL (ref 6–20)
CALCIUM UR-MCNC: 9.1 MG/DL (ref 8.5–10.3)
CHLORIDE SERPL-SCNC: 106 MMOL/L (ref 101–111)
CLARITY UR REFRACT.AUTO: CLEAR
CO2 SERPL-SCNC: 23 MMOL/L (ref 21–32)
CREAT SERPLBLD-SCNC: 1 MG/DL (ref 0.4–1)
EOSINOPHIL # BLD AUTO: 0.1 10^3/UL (ref 0–0.7)
EOSINOPHIL NFR BLD AUTO: 1.1 %
ERYTHROCYTE [DISTWIDTH] IN BLOOD BY AUTOMATED COUNT: 14.1 % (ref 12–15)
GLOBULIN SER-MCNC: 3.5 G/DL (ref 2.1–4.2)
GLUCOSE SERPL-MCNC: 99 MG/DL (ref 70–100)
GLUCOSE UR QL STRIP.AUTO: NEGATIVE MG/DL
HGB UR QL STRIP: 14.4 G/DL (ref 12–16)
INR PPP: 2.2 (ref 0.8–1.2)
KETONES UR QL STRIP.AUTO: (no result) MG/DL
LIPASE SERPL-CCNC: 53 U/L (ref 22–51)
LYMPHOCYTES # SPEC AUTO: 1.3 10^3/UL (ref 1.5–3.5)
LYMPHOCYTES NFR BLD AUTO: 16.7 %
MCH RBC QN AUTO: 30.1 PG (ref 27–31)
MCHC RBC AUTO-ENTMCNC: 32.4 G/DL (ref 32–36)
MCV RBC AUTO: 92.9 FL (ref 81–99)
MONOCYTES # BLD AUTO: 0.6 10^3/UL (ref 0–1)
MONOCYTES NFR BLD AUTO: 7.6 %
NEUTROPHILS # BLD AUTO: 5.9 10^3/UL (ref 1.5–6.6)
NEUTROPHILS # SNV AUTO: 8 X10^3/UL (ref 4.8–10.8)
NEUTROPHILS NFR BLD AUTO: 73.7 %
NITRITE UR QL STRIP.AUTO: NEGATIVE
PDW BLD AUTO: 11.3 FL (ref 7.9–10.8)
PH UR STRIP.AUTO: 6 PH (ref 5–7.5)
PLATELET # BLD: 316 10^3/UL (ref 130–450)
PROT SPEC-MCNC: 7.5 G/DL (ref 6.7–8.2)
PROT UR STRIP.AUTO-MCNC: NEGATIVE MG/DL
PROTHROM ACT/NOR PPP: 23.6 SECS (ref 9.9–12.6)
RBC # UR STRIP.AUTO: NEGATIVE /UL
RBC MAR: 4.78 10^6/UL (ref 4.2–5.4)
SODIUM SERPLBLD-SCNC: 139 MMOL/L (ref 135–145)
SP GR UR STRIP.AUTO: 1.01 (ref 1–1.03)
UROBILINOGEN UR QL STRIP.AUTO: (no result) E.U./DL
UROBILINOGEN UR STRIP.AUTO-MCNC: NEGATIVE MG/DL

## 2020-09-27 PROCEDURE — 96361 HYDRATE IV INFUSION ADD-ON: CPT

## 2020-09-27 PROCEDURE — 99284 EMERGENCY DEPT VISIT MOD MDM: CPT

## 2020-09-27 PROCEDURE — 85610 PROTHROMBIN TIME: CPT

## 2020-09-27 PROCEDURE — 80053 COMPREHEN METABOLIC PANEL: CPT

## 2020-09-27 PROCEDURE — 85025 COMPLETE CBC W/AUTO DIFF WBC: CPT

## 2020-09-27 PROCEDURE — 36415 COLL VENOUS BLD VENIPUNCTURE: CPT

## 2020-09-27 PROCEDURE — 74177 CT ABD & PELVIS W/CONTRAST: CPT

## 2020-09-27 PROCEDURE — 81003 URINALYSIS AUTO W/O SCOPE: CPT

## 2020-09-27 PROCEDURE — 81001 URINALYSIS AUTO W/SCOPE: CPT

## 2020-09-27 PROCEDURE — 87086 URINE CULTURE/COLONY COUNT: CPT

## 2020-09-27 PROCEDURE — 96374 THER/PROPH/DIAG INJ IV PUSH: CPT

## 2020-09-27 PROCEDURE — 83690 ASSAY OF LIPASE: CPT

## 2020-09-27 PROCEDURE — 70450 CT HEAD/BRAIN W/O DYE: CPT

## 2020-09-28 VITALS — DIASTOLIC BLOOD PRESSURE: 99 MMHG | SYSTOLIC BLOOD PRESSURE: 156 MMHG

## 2020-09-28 NOTE — CT REPORT
PROCEDURE:  HEAD WO

 

INDICATIONS:  headache

 

TECHNIQUE:  

Noncontrast 4.5 mm thick angled axial sections acquired from the foramen magnum to the vertex.  For r
adiation dose reduction, the following was used:  automated exposure control, adjustment of mA and/or
 kV according to patient size.

 

COMPARISON:  CT of 11/5/2015

 

FINDINGS:  

Image quality:  Excellent.  

 

The ventricular system and cortical sulci demonstrate atrophy, consistent for patient's stated age.  
There are areas of hypodensity in the periventricular and subcortical white matter.  There is no acut
e intra or extra-axial fluid collection.  No acute hemorrhage, mass lesion or midline shift.  Brainst
em is unremarkable.  Globes are symmetrical. Sinuses are aerated. Osseous structures are intact. 

 

IMPRESSION:  

 

1.  No acute intracranial process.

2.  Mild to moderate atrophy and chronic microvascular ischemic changes.

 

The above findings are concordant with preliminary report. 

 

Reviewed by: Leslie Frances MD on 9/28/2020 7:56 AM PDT

Approved by: Leslie Frances MD on 9/28/2020 7:56 AM PDT

 

 

Station ID:  [id number]

## 2020-09-28 NOTE — CT REPORT
PROCEDURE:  Abdomen/Pelvis W

 

INDICATIONS:  abd pain, iv only

 

CONTRAST:  IV CONTRAST: Optiray 320 ml: 100 PO CONTRAST: *NO PO CONTRAST 

 

TECHNIQUE:  

After the administration of intravenous contrast, 5 mm thick sections acquired from the diaphragms to
 the symphysis.  5 mm thick coronal and sagittal reformats were acquired.  For radiation dose reducti
on, the following was used:  automated exposure control, adjustment of mA and/or kV according to alyssa
ent size.  

 

COMPARISON:  None.

 

FINDINGS:  

Image quality:  Excellent.  

 

ABDOMEN:  

Lung bases:  Right middle lobe atelectasis versus scarring. Emphysematous changes. Left breast prosth
esis noted.

 

Solid organs:  Liver and spleen are normal in size and enhancement.  Gallbladder within normal limits
  Biliary system is non dilated.  Pancreas enhances normally.  No adrenal nodules.  Kidneys demonstra
te normal size and enhancement, without hydronephrosis.  

 

Peritoneum and bowel: There is a short segment of threshold dilated small bowel in the inferior right
 abdomen. This measures up to 3.9 cm. Associated with this loop of bowel is mucosal hyperenhancement 
along with mural stratification. Fecalization of contents within this loop of bowel. There is some mi
ld mesenteric edema and enlargement of the mesenteric veins draining this segment of bowel. Within th
e mesentery draining this bowel there is also a lobulated high density abnormality which is felt to r
epresent a potential visceral artery aneurysm. This measures approximately 2.3 cm on series 3 image 5
0. Large volume of stool throughout the colon. The remaining bowel is unremarkable.

 

Nodes and vessels: The threshold enlarged mesenteric lymph node. Extensive aortic and iliac artery at
herosclerosis. The IVC is unremarkable.

 

Miscellaneous:  No ventral hernias.  

 

 

PELVIS:  

Genitourinary: Normal urinary bladder wall thickness. Status post hysterectomy.

 

Miscellaneous: Impression: Large pelvic or inguinal lymph nodes. No inguinal or femoral hernia.

 

Bones:  No suspicious bony lesions.  No vertebral body compression fractures.  L4-L5 PLIF changes. Re
mote left pubic rami fractures.

 

IMPRESSION:  

 

1. Threshold dilated loop of small bowel with wall thickening and mucosal hyperenhancement along with
 subcutaneous mucosal edema. Findings are suggestive of an acute inflammatory/infectious enteritis, p
otentially with a partial obstruction as suggested by fecalization of contents. This represents a jose luis
nge from the preliminary report.

 

2. Lobulated high density abnormality within the inferior mesentery adjacent to numerous vessels is s
uspicious for mesenteric aneurysm. Outpatient CT angiogram recommended for further evaluation. This r
epresents a change from the preliminary report.

 

Reviewed by: Robel Silveira MD on 9/28/2020 8:59 AM PDT

Approved by: Robel Silveira MD on 9/28/2020 8:59 AM PDT

 

 

Station ID:  [id number]

## 2020-11-06 ENCOUNTER — HOSPITAL ENCOUNTER (OUTPATIENT)
Dept: HOSPITAL 76 - LAB.WCP | Age: 80
Discharge: HOME | End: 2020-11-06
Attending: FAMILY MEDICINE
Payer: MEDICARE

## 2020-11-06 DIAGNOSIS — Z79.01: Primary | ICD-10-CM

## 2020-11-13 ENCOUNTER — HOSPITAL ENCOUNTER (OUTPATIENT)
Dept: HOSPITAL 76 - LAB.WCP | Age: 80
Discharge: HOME | End: 2020-11-13
Attending: FAMILY MEDICINE
Payer: MEDICARE

## 2020-11-13 DIAGNOSIS — Z79.01: Primary | ICD-10-CM

## 2020-11-18 ENCOUNTER — HOSPITAL ENCOUNTER (OUTPATIENT)
Dept: HOSPITAL 76 - DI.N | Age: 80
Discharge: HOME | End: 2020-11-18
Attending: FAMILY MEDICINE
Payer: MEDICARE

## 2020-11-18 DIAGNOSIS — Z98.1: ICD-10-CM

## 2020-11-18 DIAGNOSIS — M47.26: Primary | ICD-10-CM

## 2020-11-18 NOTE — XRAY REPORT
PROCEDURE:  Lumbar Spine Complete

 

INDICATIONS:  LOW BACK PAIN WITH RADICULOPATHY

 

TECHNIQUE:  4 views of the lumbar spine were acquired.  

 

COMPARISON:  None.

 

FINDINGS:  

 

Bones:  5 non-rib-bearing vertebrae are present.  There is normal bony alignment maintained by transv
erse pedicle screws and vertical fixation rods crossing the L4-L5 levels, with a mild degree of degen
erative disc disease immediately above and below. Very slight anterolisthesis, grade 1, of L3 on L4 i
s present..  No vertebral body compression fractures.  No suspicious bony lesions.  

 

Soft tissues:  Overlying bowel gas pattern is normal.  No suspicious soft tissue calcifications.  

 

IMPRESSION:  Prior L4-5 fusion procedure without evidence of device loosening or disruption. Normal a
lignment established across the fusion level. Slight anterolisthesis grade 1 of L3 on L4 is present l
ikely due to slight ligamentous laxity from mild disc height reduction and mild facet osteoarthritis 
at the L3-L4 facet joints.

 

Reviewed by: Eric Cruz MD on 11/18/2020 4:58 PM PST

Approved by: Eric Cruz MD on 11/18/2020 4:58 PM PST

 

 

Station ID:  [id number]

## 2020-12-08 ENCOUNTER — HOSPITAL ENCOUNTER (OUTPATIENT)
Dept: HOSPITAL 76 - LAB.WCP | Age: 80
Discharge: HOME | End: 2020-12-08
Attending: FAMILY MEDICINE
Payer: MEDICARE

## 2020-12-08 DIAGNOSIS — Z79.01: Primary | ICD-10-CM

## 2020-12-21 ENCOUNTER — HOSPITAL ENCOUNTER (EMERGENCY)
Dept: HOSPITAL 76 - ED | Age: 80
Discharge: LEFT BEFORE DISCHARGE | End: 2020-12-21
Payer: MEDICARE

## 2020-12-21 VITALS — DIASTOLIC BLOOD PRESSURE: 65 MMHG | SYSTOLIC BLOOD PRESSURE: 136 MMHG

## 2020-12-21 DIAGNOSIS — Z53.21: Primary | ICD-10-CM

## 2020-12-21 LAB
ALBUMIN DIAFP-MCNC: 4.3 G/DL (ref 3.2–5.5)
ALBUMIN/GLOB SERPL: 1.2 {RATIO} (ref 1–2.2)
ALP SERPL-CCNC: 288 IU/L (ref 42–121)
ALT SERPL W P-5'-P-CCNC: 33 IU/L (ref 10–60)
ANION GAP SERPL CALCULATED.4IONS-SCNC: 13 MMOL/L (ref 6–13)
AST SERPL W P-5'-P-CCNC: 35 IU/L (ref 10–42)
BASOPHILS NFR BLD AUTO: 0 10^3/UL (ref 0–0.1)
BASOPHILS NFR BLD AUTO: 0.6 %
BILIRUB BLD-MCNC: 0.4 MG/DL (ref 0.2–1)
BUN SERPL-MCNC: 13 MG/DL (ref 6–20)
CALCIUM UR-MCNC: 9.1 MG/DL (ref 8.5–10.3)
CHLORIDE SERPL-SCNC: 96 MMOL/L (ref 101–111)
CO2 SERPL-SCNC: 29 MMOL/L (ref 21–32)
CREAT SERPLBLD-SCNC: 1 MG/DL (ref 0.4–1)
EOSINOPHIL # BLD AUTO: 0.1 10^3/UL (ref 0–0.7)
EOSINOPHIL NFR BLD AUTO: 2.3 %
ERYTHROCYTE [DISTWIDTH] IN BLOOD BY AUTOMATED COUNT: 13.9 % (ref 12–15)
GLOBULIN SER-MCNC: 3.5 G/DL (ref 2.1–4.2)
GLUCOSE SERPL-MCNC: 102 MG/DL (ref 70–100)
HGB UR QL STRIP: 14.4 G/DL (ref 12–16)
LIPASE SERPL-CCNC: 43 U/L (ref 22–51)
LYMPHOCYTES # SPEC AUTO: 1.3 10^3/UL (ref 1.5–3.5)
LYMPHOCYTES NFR BLD AUTO: 24.8 %
MCH RBC QN AUTO: 30 PG (ref 27–31)
MCHC RBC AUTO-ENTMCNC: 30.8 G/DL (ref 32–36)
MCV RBC AUTO: 97.3 FL (ref 81–99)
MONOCYTES # BLD AUTO: 0.4 10^3/UL (ref 0–1)
MONOCYTES NFR BLD AUTO: 7 %
NEUTROPHILS # BLD AUTO: 3.4 10^3/UL (ref 1.5–6.6)
NEUTROPHILS # SNV AUTO: 5.2 X10^3/UL (ref 4.8–10.8)
NEUTROPHILS NFR BLD AUTO: 65.1 %
PDW BLD AUTO: 10.2 FL (ref 7.9–10.8)
PLATELET # BLD: 295 10^3/UL (ref 130–450)
PROT SPEC-MCNC: 7.8 G/DL (ref 6.7–8.2)
RBC MAR: 4.8 10^6/UL (ref 4.2–5.4)
SODIUM SERPLBLD-SCNC: 138 MMOL/L (ref 135–145)

## 2020-12-21 PROCEDURE — 80053 COMPREHEN METABOLIC PANEL: CPT

## 2020-12-21 PROCEDURE — 99283 EMERGENCY DEPT VISIT LOW MDM: CPT

## 2020-12-21 PROCEDURE — 85025 COMPLETE CBC W/AUTO DIFF WBC: CPT

## 2020-12-21 PROCEDURE — 83690 ASSAY OF LIPASE: CPT

## 2020-12-21 PROCEDURE — 36415 COLL VENOUS BLD VENIPUNCTURE: CPT

## 2020-12-28 ENCOUNTER — HOSPITAL ENCOUNTER (OUTPATIENT)
Age: 80
Discharge: HOME | End: 2020-12-28
Payer: MEDICARE

## 2020-12-28 DIAGNOSIS — Z79.01: Primary | ICD-10-CM

## 2021-01-11 ENCOUNTER — HOSPITAL ENCOUNTER (OUTPATIENT)
Dept: HOSPITAL 76 - LAB.N | Age: 81
Discharge: HOME | End: 2021-01-11
Attending: FAMILY MEDICINE
Payer: MEDICARE

## 2021-01-11 DIAGNOSIS — Z79.01: Primary | ICD-10-CM

## 2021-01-13 ENCOUNTER — HOSPITAL ENCOUNTER (OUTPATIENT)
Dept: HOSPITAL 76 - LAB.WCP | Age: 81
Discharge: HOME | End: 2021-01-13
Attending: FAMILY MEDICINE
Payer: MEDICARE

## 2021-01-13 DIAGNOSIS — Z79.01: Primary | ICD-10-CM

## 2021-01-14 ENCOUNTER — HOSPITAL ENCOUNTER (OUTPATIENT)
Dept: HOSPITAL 76 - EMS | Age: 81
End: 2021-01-14
Attending: SURGERY
Payer: MEDICARE

## 2021-01-14 DIAGNOSIS — R10.9: Primary | ICD-10-CM

## 2021-01-15 ENCOUNTER — HOSPITAL ENCOUNTER (INPATIENT)
Dept: HOSPITAL 76 - ED | Age: 81
LOS: 7 days | Discharge: HOME | DRG: 330 | End: 2021-01-22
Attending: SURGERY | Admitting: NURSE PRACTITIONER
Payer: MEDICARE

## 2021-01-15 ENCOUNTER — HOSPITAL ENCOUNTER (OUTPATIENT)
Dept: HOSPITAL 76 - EMS | Age: 81
Discharge: ANOTHER HOSPITAL | End: 2021-01-15
Attending: SURGERY
Payer: MEDICARE

## 2021-01-15 DIAGNOSIS — C7B.09: ICD-10-CM

## 2021-01-15 DIAGNOSIS — R42: ICD-10-CM

## 2021-01-15 DIAGNOSIS — M19.90: ICD-10-CM

## 2021-01-15 DIAGNOSIS — Z79.891: ICD-10-CM

## 2021-01-15 DIAGNOSIS — K56.609: Primary | ICD-10-CM

## 2021-01-15 DIAGNOSIS — T40.2X5A: ICD-10-CM

## 2021-01-15 DIAGNOSIS — E03.9: ICD-10-CM
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
## 2021-01-15 DIAGNOSIS — M79.7: ICD-10-CM

## 2021-01-15 DIAGNOSIS — K44.9: ICD-10-CM

## 2021-01-15 DIAGNOSIS — T41.45XA: ICD-10-CM

## 2021-01-15 DIAGNOSIS — I48.91: ICD-10-CM

## 2021-01-15 DIAGNOSIS — C7B.04: ICD-10-CM

## 2021-01-15 DIAGNOSIS — R10.9: Primary | ICD-10-CM

## 2021-01-15 DIAGNOSIS — R11.2: ICD-10-CM

## 2021-01-15 DIAGNOSIS — R41.0: ICD-10-CM

## 2021-01-15 DIAGNOSIS — I10: ICD-10-CM

## 2021-01-15 DIAGNOSIS — I48.20: ICD-10-CM

## 2021-01-15 DIAGNOSIS — Z79.899: ICD-10-CM

## 2021-01-15 DIAGNOSIS — Z96.659: ICD-10-CM

## 2021-01-15 DIAGNOSIS — K56.7: ICD-10-CM

## 2021-01-15 DIAGNOSIS — R33.9: ICD-10-CM

## 2021-01-15 DIAGNOSIS — C7A.019: ICD-10-CM

## 2021-01-15 DIAGNOSIS — Z79.01: ICD-10-CM

## 2021-01-15 LAB
ALBUMIN DIAFP-MCNC: 3.9 G/DL (ref 3.2–5.5)
ALBUMIN/GLOB SERPL: 1.2 {RATIO} (ref 1–2.2)
ALP SERPL-CCNC: 160 IU/L (ref 42–121)
ALT SERPL W P-5'-P-CCNC: 19 IU/L (ref 10–60)
ANION GAP SERPL CALCULATED.4IONS-SCNC: 10 MMOL/L (ref 6–13)
AST SERPL W P-5'-P-CCNC: 21 IU/L (ref 10–42)
BASOPHILS NFR BLD AUTO: 0 10^3/UL (ref 0–0.1)
BASOPHILS NFR BLD AUTO: 0.5 %
BILIRUB BLD-MCNC: 0.8 MG/DL (ref 0.2–1)
BUN SERPL-MCNC: 13 MG/DL (ref 6–20)
C PNEUM DNA NPH QL NAA+NON-PROBE: NOT DETECTED
CALCIUM UR-MCNC: 8.7 MG/DL (ref 8.5–10.3)
CHLORIDE SERPL-SCNC: 103 MMOL/L (ref 101–111)
CLARITY UR REFRACT.AUTO: CLEAR
CO2 SERPL-SCNC: 27 MMOL/L (ref 21–32)
CREAT SERPLBLD-SCNC: 0.8 MG/DL (ref 0.4–1)
EOSINOPHIL # BLD AUTO: 0.1 10^3/UL (ref 0–0.7)
EOSINOPHIL NFR BLD AUTO: 0.9 %
ERYTHROCYTE [DISTWIDTH] IN BLOOD BY AUTOMATED COUNT: 13.5 % (ref 12–15)
GLOBULIN SER-MCNC: 3.2 G/DL (ref 2.1–4.2)
GLUCOSE SERPL-MCNC: 102 MG/DL (ref 70–100)
GLUCOSE UR QL STRIP.AUTO: NEGATIVE MG/DL
HGB UR QL STRIP: 15.7 G/DL (ref 12–16)
INR PPP: 4.7 (ref 0.8–1.2)
KETONES UR QL STRIP.AUTO: NEGATIVE MG/DL
LIPASE SERPL-CCNC: 45 U/L (ref 22–51)
LYMPHOCYTES # SPEC AUTO: 1.1 10^3/UL (ref 1.5–3.5)
LYMPHOCYTES NFR BLD AUTO: 14.2 %
MAGNESIUM SERPL-MCNC: 2 MG/DL (ref 1.7–2.8)
MCH RBC QN AUTO: 30.8 PG (ref 27–31)
MCHC RBC AUTO-ENTMCNC: 32.3 G/DL (ref 32–36)
MCV RBC AUTO: 95.3 FL (ref 81–99)
MONOCYTES # BLD AUTO: 0.6 10^3/UL (ref 0–1)
MONOCYTES NFR BLD AUTO: 7.3 %
NEUTROPHILS # BLD AUTO: 6 10^3/UL (ref 1.5–6.6)
NEUTROPHILS # SNV AUTO: 7.8 X10^3/UL (ref 4.8–10.8)
NEUTROPHILS NFR BLD AUTO: 76.8 %
NITRITE UR QL STRIP.AUTO: NEGATIVE
PDW BLD AUTO: 10.1 FL (ref 7.9–10.8)
PH UR STRIP.AUTO: 6.5 PH (ref 5–7.5)
PLATELET # BLD: 250 10^3/UL (ref 130–450)
PROT SPEC-MCNC: 7.1 G/DL (ref 6.7–8.2)
PROT UR STRIP.AUTO-MCNC: NEGATIVE MG/DL
PROTHROM ACT/NOR PPP: 47.9 SECS (ref 9.9–12.6)
RBC # UR STRIP.AUTO: NEGATIVE /UL
RBC MAR: 5.1 10^6/UL (ref 4.2–5.4)
SP GR UR STRIP.AUTO: 1.02 (ref 1–1.03)
UROBILINOGEN UR QL STRIP.AUTO: (no result) E.U./DL
UROBILINOGEN UR STRIP.AUTO-MCNC: NEGATIVE MG/DL

## 2021-01-15 PROCEDURE — 83735 ASSAY OF MAGNESIUM: CPT

## 2021-01-15 PROCEDURE — 85610 PROTHROMBIN TIME: CPT

## 2021-01-15 PROCEDURE — 80048 BASIC METABOLIC PNL TOTAL CA: CPT

## 2021-01-15 PROCEDURE — 84443 ASSAY THYROID STIM HORMONE: CPT

## 2021-01-15 PROCEDURE — 74177 CT ABD & PELVIS W/CONTRAST: CPT

## 2021-01-15 PROCEDURE — 81003 URINALYSIS AUTO W/O SCOPE: CPT

## 2021-01-15 PROCEDURE — 96374 THER/PROPH/DIAG INJ IV PUSH: CPT

## 2021-01-15 PROCEDURE — 87086 URINE CULTURE/COLONY COUNT: CPT

## 2021-01-15 PROCEDURE — 81001 URINALYSIS AUTO W/SCOPE: CPT

## 2021-01-15 PROCEDURE — 86901 BLOOD TYPING SEROLOGIC RH(D): CPT

## 2021-01-15 PROCEDURE — 0202U NFCT DS 22 TRGT SARS-COV-2: CPT

## 2021-01-15 PROCEDURE — 87631 RESP VIRUS 3-5 TARGETS: CPT

## 2021-01-15 PROCEDURE — 83690 ASSAY OF LIPASE: CPT

## 2021-01-15 PROCEDURE — 85025 COMPLETE CBC W/AUTO DIFF WBC: CPT

## 2021-01-15 PROCEDURE — 96375 TX/PRO/DX INJ NEW DRUG ADDON: CPT

## 2021-01-15 PROCEDURE — 86900 BLOOD TYPING SEROLOGIC ABO: CPT

## 2021-01-15 PROCEDURE — 99284 EMERGENCY DEPT VISIT MOD MDM: CPT

## 2021-01-15 PROCEDURE — 80053 COMPREHEN METABOLIC PANEL: CPT

## 2021-01-15 PROCEDURE — 99285 EMERGENCY DEPT VISIT HI MDM: CPT

## 2021-01-15 PROCEDURE — 36415 COLL VENOUS BLD VENIPUNCTURE: CPT

## 2021-01-15 RX ADMIN — SODIUM CHLORIDE, PRESERVATIVE FREE SCH: 5 INJECTION INTRAVENOUS at 17:48

## 2021-01-15 RX ADMIN — POTASSIUM CHLORIDE, DEXTROSE MONOHYDRATE AND SODIUM CHLORIDE SCH MLS/HR: 150; 5; 450 INJECTION, SOLUTION INTRAVENOUS at 14:55

## 2021-01-15 NOTE — HISTORY & PHYSICAL EXAMINATION
Chief Complaint





- Chief Complaint


Chief Complaint: abdominal pain





History of Present Illness





- Admitted From


Admitted From:: ER





- History Obtained From


Records Reviewed: [facility]


History obtained from: pt


Exam Limitations: no





- History of Present Illness


HPI Comment/Other: 


This is a 80-years old female with a past medical history significant for a 

fibrillation, hypothyroidism, hiatal hernia, osteoarthritis, fibromyalgia Who 

present to ER complain abdominal pain. Patient reported her abdominal pain was 

on and off for couple weeks. Beginning yesterday and special today, She feel her

abdominal pain is much intensive. She reported she cannot eat anything which 

will cause her sharp and severe abdominal pain. She report abdominal pain mainly

located in upper Gastric,Then the pain gradually running down alone The middle o

f abdomen. She reported she have a poor appetite. Her last bowel movement was 

the day before yesterday. She denies nausea and vomiting, Fever, chill, 

Shortness of breathing, Cough, Chest pain. Patient has a history of A 

fibrillation, patient took her Coumadin at home. routine laboratory test show [facility] [place] had INR 4.7, ER already give patient vitamin K and frozen plasma. CAT scan

of abdomen and pelvis show high-grade central small bowel obstruction. Surgeon 

was consulted by ER. Given above medical condition, medical team was consulted 

for admission the patient.


Discussed the care goal with the patient,  patient request full code








History





- Past Medical History


Cardiovascular: reports: Atrial fibrillation


Respiratory: reports: None


Endocrine/Autoimmune: reports: HyPOthyroidism


GI: reports: None, Hiatal hernia


: reports: None


HEENT: reports: None


Psych: reports: None


Musculoskeletal: reports: Osteoarthritis, Fibromyalgia


Derm: reports: None


MRSA Hx?: No





- Past Surgical History


General: reports: Colonoscopy, EGD


Ortho: reports: Knee replacement, Spine surgery


/GYN: reports: Hysterectomy


HEENT: reports: Cataracts, Tonsil/Adenoidectomy





- Family & Social History


Family History: Mother: , Father: 


Family History Comment/Other: Patient report her father  at age 78 from lung

cancer, Her mother  at year , from complication of the GI tract


Social History Notes: Patient denies any history of cigarette smoking, alcohol 

or drug use. she is a window, She live at [place], she had 1 daughter.





- POLST


Patient has POLST: No





Meds/Allgy





- Home Medications


Home Medications: 


                                Ambulatory Orders











 Medication  Instructions  Recorded  Confirmed


 


Estradiol 0.5 mg PO DAILY 10/21/13 01/15/21


 


Trazodone HCl 100 mg PO HS PRN 10/21/13 01/15/21


 


Cyclobenzaprine [Flexeril] 10 mg PO DAILY PM 12/09/15 01/15/21


 


DULoxetine [Cymbalta] 60 mg PO DAILY 12/09/15 01/15/21


 


Lovastatin 20 mg PO DAILY 12/09/15 01/15/21


 


Metoprolol Succinate 100 mg PO QPM 12/09/15 01/15/21


 


Warfarin [Coumadin] 2 mg PO QDWARFARIN 12/09/15 01/15/21


 


Omeprazole [PriLOSEC] 20 mg PO BID 06/21/16 01/15/21


 


Gabapentin 1,200 mg PO QPM 07/31/18 01/15/21


 


Hydrocodone/Acetaminophen [Norco 1 tab PO PRN PRN 12/21/20 01/15/21





7.5-325 Tablet]   


 


Furosemide [Lasix] 20 mg PO DAILY 01/15/21 01/15/21


 


Liothyronine [Cytomel] 5 mcg PO DAILY 01/15/21 01/15/21


 


Potassium Chloride 10 meq PO DAILY 01/15/21 01/15/21














- Allergies


Allergies/Adverse Reactions: 


                                    Allergies











Allergy/AdvReac Type Severity Reaction Status Date / Time


 


iron dextran complex AdvReac  Respiratory Verified 01/15/21 09:04














Review of Systems





- Constitutional


Constitutional: denies: Fatigue, Fever, Chills, Weakness, Poor appetite, 

Diaphoresis





- Eyes


Eyes: denies: Pain, Blurred vision, Field loss, Vision loss





- Ears, Nose & Throat


Ears, Nose & Throat: denies: Ear pain, Tinnitus, Nosebleeds, Bleeding gums





- Cardiovascular


Cariovascular: denies: Irregular heart rate, Palpitations, Chest pain, Edema, 

Lightheadedness, Syncope, Exertional dyspnea, Decr. exercise tolerance





- Respiratory


Respiratory: denies: Cough, Sputum production, Wheezing, Hemoptysis, Orthopnea, 

SOB at rest, SOB with exertion





- Gastrointestinal


Gastrointestinal: reports: Abdominal pain.  denies: Constipation, Diarrhea, 

Rectal bleeding, Black stools, Bloody stools, Nausea, Vomiting





- Genitourinary


Genitourinary: denies: Dysuria, Urgency, Incontinence





- Musculoskeletal


Musculoskeletal: denies: Muscle pain, Muscle aches, Limited range of motion





- Integumentary


Integumentary: denies: Rash, Lesions





- Neurological


Neurological: denies: General weakness, Focal weakness, Headache, Dizziness, 

Numbness, Memory problems, Pre-existing deficit, Abnormal gait, Seizures, 

Incoordination, Slurred speech





- Psychiatric


Psychiatric: denies: Depression, Suicidal, Delusions





- Endocrine


Endocrine: denies: Polyuria, Polydypsia





- Hematologic/Lymphatic


Hematologic/Lymphatic: denies: Anemia, Blood clots


Prior Level of Functionality: 





Patient is independent





Exam





- Vital Signs


Vital Signs: 





                                Vital Signs x48h











  Temp Pulse Resp BP Pulse Ox


 


 01/15/21 12:00   104 H  12  158/95 H  106 H


 


 01/15/21 11:05  36.6 C  105 H  14  158/87 H  98


 


 01/15/21 09:59   96  15  129/83 H  97


 


 01/15/21 09:11   98  16  145/102 H  95


 


 01/15/21 08:55  36.6 C  110 H  20  162/102 H  95














- Physical Exam


General Appearance: positive: No acute distress, Alert.  negative: Lethargic


Eyes Bilateral: positive: Normal inspection, PERRL, No lid inflammation


ENT: positive: ENT inspection nml, No signs of dehydration.  negative: Purulent 

nasal drainage


Neck: positive: Nml inspection, Trachea midline.  negative: Thyromegaly, 

Tracheal deviation


Respiratory: positive: Chest non-tender, No respiratory distress, Breath sounds 

nml.  negative: Wheezes, Rales, Rhonchi


Cardiovascular: positive: Regular rate & rhythm, No murmur.  negative: 

Tachycardia, Bradycardia, Systolic murmur, Diastolic murmur


Peripheral Pulses: positive: 2+


Abdomen: positive: Non-tender, Abnml bowel sounds (Diminished bowel sounds in 

all quadrant).  negative: No organomegaly, No distention, Tenderness, Guarding


Back: positive: Nml inspection.  negative: CVA tenderness (R), CVA tenderness 

(L)


Skin: positive: Color nml, Warm, Dry.  negative: Cyanosis, Diaphoresis, Pallor


Extremities: positive: Non-tender, Full ROM, Nml appearance.  negative: Pedal 

edema, Calf tenderness


Neurologic/Psychiatric: positive: Oriented x3, Motor nml, Sensation nml.  

negative: Weakness, Sensory loss, Facial droop, Slurred/abnml speech, Depressed 

mood/affect





Conclusion/Plan





- Problem List


(1) Small bowel obstruction


Conclusion/Plan: 


CAT scan show patient had small bowel obstruction. Patient present abdominal 

pain but has no nausea or vomiting. Patient had a bowel movement 2 days ago.CAT 

scan of abdomen did not show any malignancy. But the patient has hx of chronic 

pain and fibromyalgia, Take your pain medication opiates in the home.


consult GI surgeon already in ER, because patient has no nausea or vomiting, so 

no NG tube Is indicated for patient now. Encourage patient ambulation. Reduce 

opiates as possible for pain control, NPO, with intravenous IV fluids.








(2) Supratherapeutic INR


Conclusion/Plan: 


Patient taking Coumadin for her a fibrillation, INR 4.7, ER already give patient

 vitamin K and frozen plasma. We will continue check PT/INR. Hold the Coumadin








(3) Chronic atrial fibrillation


Conclusion/Plan: 


Patient had a chronic atrial fibrillation, and that she take her Coumadin for 

anticoagulation. We will give patient intravenous metoprolol to control heart 

rate, Hold Coumadin now, Continue telemetry








(4) Hypothyroidism


Conclusion/Plan: 


We will check patient TSH, will resume patient home Synthroid








(5) Osteoarthritis


Conclusion/Plan: 


Patient take pain medication at home, Since the patient had small bowel 

obstruction, we will continue pain control








- Lab Results


Fish Bones: 


                                 01/15/21 09:41





                                 01/15/21 09:41





Core Measures





- Anticipated LOS


I expect patient to be DC'd or transferred within 96 hours.: Yes





- DVT/VTE - Prophylaxis


VTE/DVT Device ordered at admit?: Yes


VTE/DVT Prophylaxis med ordered at admit?: Yes

## 2021-01-15 NOTE — ED PHYSICIAN DOCUMENTATION
PD HPI ABD PAIN





- Stated complaint


Stated Complaint: AB PX





- Chief complaint


Chief Complaint: Abd Pain





- History obtained from


History obtained from: Patient





- History of Present Illness


Timing - onset: Last night


Timing - duration: Days (1/2)


Timing - details: Abrupt onset, Still present


Quality: Cramping, Aching, Fullness/distended, Pain


Location: Periumbilical (mida bd, but with some general abd bloating.)


Radiation: Lower back


Improved by: Vomiting.  No: Eating


Worsened by: Eating (she states only able to eat small bites or drink small 

amounts, then feels bloated and increased pain. Does not feel discomfort in the 

esophagus.)


Associated symptoms: Nausea, Vomiting.  No: Fever, Diarrhea, Constipation, 

Dysuria


Similar symptoms before: Has not had sx before (she has had esophageal stricture

from GERD and needed esophageal dilatation twice, but that was in 

esophageal/lower sternal area. Has not had the mid abd pain like current in the 

past.)





Review of Systems


Constitutional: denies: Fever, Chills


Nose: denies: Rhinorrhea / runny nose, Congestion


Throat: denies: Sore throat


Respiratory: denies: Cough


GI: reports: Abdominal Pain, Abdominal Swelling, Nausea, Vomiting.  denies: 

Constipation, Diarrhea


: denies: Dysuria, Frequency


Musculoskeletal: denies: Neck pain, Back pain





PD PAST MEDICAL HISTORY





- Past Medical History


Past Medical History: Yes


Cardiovascular: Atrial fibrillation


Respiratory: None


Endocrine/Autoimmune: HyPOthyroidism


GI: None, Hiatal hernia


: None


HEENT: None


Psych: None


Musculoskeletal: Osteoarthritis, Fibromyalgia


Derm: None





- Past Surgical History


Past Surgical History: Yes


General: Colonoscopy, EGD


Ortho: Knee replacement, Spine surgery


/GYN: Hysterectomy


HEENT: Cataracts, Tonsil/Adenoidectomy





- Present Medications


Home Medications: 


                                Ambulatory Orders











 Medication  Instructions  Recorded  Confirmed


 


Estradiol 0.5 mg PO DAILY 10/21/13 01/15/21


 


Trazodone HCl 100 mg PO HS PRN 10/21/13 01/15/21


 


Cyclobenzaprine [Flexeril] 10 mg PO DAILY PM 12/09/15 01/15/21


 


DULoxetine [Cymbalta] 60 mg PO DAILY 12/09/15 01/15/21


 


Lovastatin 20 mg PO DAILY 12/09/15 01/15/21


 


Metoprolol Succinate 100 mg PO QPM 12/09/15 01/15/21


 


Warfarin [Coumadin] 2 mg PO QDWARFARIN 12/09/15 01/15/21


 


Omeprazole [PriLOSEC] 20 mg PO BID 06/21/16 01/15/21


 


Gabapentin 1,200 mg PO QPM 07/31/18 01/15/21


 


Hydrocodone/Acetaminophen [Norco 1 tab PO PRN PRN 12/21/20 01/15/21





7.5-325 Tablet]   


 


Furosemide [Lasix] 20 mg PO DAILY 01/15/21 01/15/21


 


Liothyronine [Cytomel] 5 mcg PO DAILY 01/15/21 01/15/21


 


Potassium Chloride 10 meq PO DAILY 01/15/21 01/15/21














- Allergies


Allergies/Adverse Reactions: 


                                    Allergies











Allergy/AdvReac Type Severity Reaction Status Date / Time


 


iron dextran complex AdvReac  Respiratory Verified 01/15/21 09:04














- Social History


Does the pt smoke?: No


Smoking Status: Never smoker


Does the pt drink ETOH?: No


Does the pt have substance abuse?: No





- Immunizations


Immunizations are current?: Yes





- POLST


Patient has POLST: No





PD ED PE NORMAL





- Vitals


Vital signs reviewed: Yes





- General


General: Alert and oriented X 3, Well developed/nourished





- HEENT


HEENT: Pharynx benign





- Neck


Neck: Supple, no meningeal sign, No adenopathy





- Cardiac


Cardiac: RRR (tachycardic), No murmur





- Respiratory


Respiratory: No respiratory distress, Clear bilaterally





- Abdomen


Abdomen: No organomegaly, Other (increased bowel sounds and some general 

distension mid abd. tender mid abd mainly. Small umbilical hernia that is soft 

and reducible. )





- Female 


Female : Deferred





- Rectal


Rectal: Deferred





- Back


Back: No CVA TTP





Results





- Vitals


Vitals: 


                               Vital Signs - 24 hr











  01/15/21 01/15/21 01/15/21





  08:55 09:11 09:59


 


Temperature 36.6 C  


 


Heart Rate 110 H 98 96


 


Respiratory 20 16 15





Rate   


 


Blood Pressure 162/102 H 145/102 H 129/83 H


 


O2 Saturation 95 95 97














  01/15/21





  11:05


 


Temperature 36.6 C


 


Heart Rate 105 H


 


Respiratory 14





Rate 


 


Blood Pressure 158/87 H


 


O2 Saturation 98








                                     Oxygen











O2 Source [With Activity]      Room air


 


O2 Source [Without Activity]   Room air


 


O2 Source                      Room air

















- Labs


Labs: 


                                Laboratory Tests











  01/15/21 01/15/21 01/15/21





  09:35 09:41 09:41


 


WBC   7.8 


 


RBC   5.10 


 


Hgb   15.7 


 


Hct   48.6 H 


 


MCV   95.3 


 


MCH   30.8 


 


MCHC   32.3 


 


RDW   13.5 


 


Plt Count   250 


 


MPV   10.1 


 


Neut # (Auto)   6.0 


 


Lymph # (Auto)   1.1 L 


 


Mono # (Auto)   0.6 


 


Eos # (Auto)   0.1 


 


Baso # (Auto)   0.0 


 


Absolute Nucleated RBC   0.00 


 


Nucleated RBC %   0.0 


 


PT   


 


INR   


 


Sodium    140


 


Potassium    3.6


 


Chloride    103


 


Carbon Dioxide    27


 


Anion Gap    10.0


 


BUN    13


 


Creatinine    0.8


 


Estimated GFR (MDRD)    69 L


 


Glucose    102 H


 


Calcium    8.7


 


Magnesium    2.0


 


Total Bilirubin    0.8


 


AST    21


 


ALT    19


 


Alkaline Phosphatase    160 H


 


Total Protein    7.1


 


Albumin    3.9


 


Globulin    3.2


 


Albumin/Globulin Ratio    1.2


 


Lipase    45


 


Urine Color  YELLOW  


 


Urine Clarity  CLEAR  


 


Urine pH  6.5  


 


Ur Specific Gravity  1.020  


 


Urine Protein  NEGATIVE  


 


Urine Glucose (UA)  NEGATIVE  


 


Urine Ketones  NEGATIVE  


 


Urine Occult Blood  NEGATIVE  


 


Urine Nitrite  NEGATIVE  


 


Urine Bilirubin  NEGATIVE  


 


Urine Urobilinogen  0.2 (NORMAL)  


 


Ur Leukocyte Esterase  NEGATIVE  


 


Ur Microscopic Review  NOT INDICATED  


 


Urine Culture Comments  NOT INDICATED  














  01/15/21





  09:41


 


WBC 


 


RBC 


 


Hgb 


 


Hct 


 


MCV 


 


MCH 


 


MCHC 


 


RDW 


 


Plt Count 


 


MPV 


 


Neut # (Auto) 


 


Lymph # (Auto) 


 


Mono # (Auto) 


 


Eos # (Auto) 


 


Baso # (Auto) 


 


Absolute Nucleated RBC 


 


Nucleated RBC % 


 


PT  47.9 H


 


INR  4.7 H*


 


Sodium 


 


Potassium 


 


Chloride 


 


Carbon Dioxide 


 


Anion Gap 


 


BUN 


 


Creatinine 


 


Estimated GFR (MDRD) 


 


Glucose 


 


Calcium 


 


Magnesium 


 


Total Bilirubin 


 


AST 


 


ALT 


 


Alkaline Phosphatase 


 


Total Protein 


 


Albumin 


 


Globulin 


 


Albumin/Globulin Ratio 


 


Lipase 


 


Urine Color 


 


Urine Clarity 


 


Urine pH 


 


Ur Specific Gravity 


 


Urine Protein 


 


Urine Glucose (UA) 


 


Urine Ketones 


 


Urine Occult Blood 


 


Urine Nitrite 


 


Urine Bilirubin 


 


Urine Urobilinogen 


 


Ur Leukocyte Esterase 


 


Ur Microscopic Review 


 


Urine Culture Comments 














- Rads (name of study)


  ** abd/pelvic CT


Radiology: Prelim report reviewed (high grade small bowel obstruction. No free 

fluid nor free air. ), See rad report





PD MEDICAL DECISION MAKING





- ED course


Complexity details: reviewed results (CT abd showing high grade small bowel 

obstruction/volvulus. ), re-evaluated patient (pain improved with IV meds. She 

is not nauseated now, so held off on NG tube. ), considered differential, d/w 

patient, d/w consultant (Dr Ortega, Surgery, and Dr. Rosario Hospitalist)





Departure





- Departure


Disposition: [facility] DC/Xfer


Clinical Impression: 


 Small bowel obstruction





Abdominal pain


Qualifiers:


 Abdominal location: periumbilical Qualified Code(s): R10.33 - Periumbilical 

pain





Condition: Stable


Record reviewed to determine appropriate education?: Yes


Discharge Date/Time: 01/15/21 14:45

## 2021-01-15 NOTE — CT REPORT
PROCEDURE:  Abdomen/Pelvis W

 

INDICATIONS:  mid/upper abd pain and vomiting

 

CONTRAST:  IV CONTRAST: Optiray 320 ml: 100 PO CONTRAST: *NO PO CONTRAST 

 

TECHNIQUE:  

After the administration of     contrast, 5 mm thick sections acquired from the diaphragms to the sym
physis.  5 mm thick coronal and sagittal reformats were acquired.  For radiation dose reduction, the 
following was used:  automated exposure control, adjustment of mA and/or kV according to patient size
.  

 

COMPARISON:  None.

 

FINDINGS:  

Image quality:  Excellent.  

 

ABDOMEN:  

Lung bases:  Lung bases are clear.  Heart size is normal. Left breast prosthesis incidentally noted.

 

Solid organs:  Liver and spleen are normal in size and enhancement.  Gallbladder unremarkable  Biliar
y system is non dilated.  Pancreas enhances normally.  No adrenal nodules.  Kidneys demonstrate suzy
l size and enhancement, without hydronephrosis.  

 

Peritoneum and bowel:  Dilated central small bowel loops seen in the central abdomen with mural thick
ening. There is also fecalization. These measure approximately 3.2 cm in diameter. Possible transitio
n point is seen in the right central abdomen for example image 53/3. The rectum is decompressed. The 
colon is relatively decompressed.  No free fluid or air.  

 

Nodes and vessels:  No retroperitoneal or mesenteric adenopathy by size criteria.  Aorta and inferior
 vena cava are normal in size.  

 

Miscellaneous:  No ventral hernias.  

 

 

PELVIS:  

Genitourinary:  Bladder wall thickness is normal.  

 

Miscellaneous:  No inguinal hernias or adenopathy.  

 

Bones:  No suspicious bony lesions.  No vertebral body compression fractures.  

 

IMPRESSION:  

 

High-grade central small bowel obstruction, as detailed above, with suspected right central abdominal
 transition point. Recommend surgical consultation.

 

 

Reviewed by: Shahriar Hill MD on 1/15/2021 10:50 AM PST

Approved by: Shahriar Hill MD on 1/15/2021 10:50 AM PST

 

 

Station ID:  [id number]

## 2021-01-15 NOTE — HISTORY & PHYSICAL EXAMINATION
Chief Complaint





- Chief Complaint


Chief Complaint: abdominal pain and loss of appetite





Abdominal Pain HPI





- Admitted From


Admitted from: ED





- History Obtained From


Records Reviewed: Old records reviewed


History obtained from: Patient


Exam limitations: No limitations





- History of Present Illness


Severity at the worst: Moderate


Pain Quality: Cramping


Duration: Chronic (worse the last day)


Worsened by: Eating


Associated symptoms: Nausea


HPI Comment/Other: 





She was seen in september for similar symptoms.  She felt better and was 

discharged.  She has had abdominal discomfort the last month.  Worse yesterday. 

Better today with not eating.





She denies prior abdominal surgery





She lives alone.  





PMH/PSH





- Past Medical History


Cardiovascular: positive: Atrial fibrillation


Respiratory: positive: None


Endocrine/Autoimmune: positive: HyPOthyroidism


GI: positive: None, Hiatal hernia


: positive: None


HEENT: positive: None


Psych: positive: None


Musculoskeletal: positive: Osteoarthritis, Fibromyalgia


Derm: positive: None


MRSA Hx?: No





- Past Surgical History


General: positive: Colonoscopy, EGD


Ortho: positive: Knee replacement, Spine surgery


/GYN: positive: Hysterectomy


HEENT: positive: Cataracts, Tonsil/Adenoidectomy





Social & Family Hx





- Social History


Does the pt smoke?: No


Smoking Status: Never smoker


Does the pt drink ETOH?: No


Does the pt have substance abuse?: No





- POLST


Patient has POLST: No





Meds/Allgy





- Home Medications


Home Medications: 


                                Ambulatory Orders











 Medication  Instructions  Recorded  Confirmed


 


Estradiol 0.5 mg PO DAILY 10/21/13 01/15/21


 


Trazodone HCl 100 mg PO HS PRN 10/21/13 01/15/21


 


Cyclobenzaprine [Flexeril] 10 mg PO DAILY PM 12/09/15 01/15/21


 


DULoxetine [Cymbalta] 60 mg PO DAILY 12/09/15 01/15/21


 


Lovastatin 20 mg PO DAILY 12/09/15 01/15/21


 


Metoprolol Succinate 100 mg PO BID 12/09/15 12/08/20


 


Warfarin [Coumadin] 2 mg PO QDWARFARIN 12/09/15 01/15/21


 


Omeprazole [PriLOSEC] 20 mg PO BID 06/21/16 01/15/21


 


Gabapentin 1,200 mg PO BID 07/31/18 01/15/21


 


Hydrocodone/Acetaminophen [Norco 1 tab PO PRN PRN 12/21/20 01/15/21





7.5-325 Tablet]   


 


Liothyronine [Cytomel] 5 mcg PO DAILY 01/15/21 


 


Potassium Chloride 10 meq PO DAILY 01/15/21 














- Allergies


Allergies/Adverse Reactions: 


                                    Allergies











Allergy/AdvReac Type Severity Reaction Status Date / Time


 


iron dextran complex AdvReac  Respiratory Verified 01/15/21 09:04














Review of Systems





- Other Findings


Other Findings: 





10 pt ros as above otherwise unremarkable





Exam





- Vital Signs


Reviewed Vital Signs: Yes


Vital Signs: 





                                Vital Signs x48h











  Temp Pulse Resp BP Pulse Ox


 


 01/15/21 12:00   104 H  12  158/95 H  106 H


 


 01/15/21 11:05  36.6 C  105 H  14  158/87 H  98


 


 01/15/21 09:59   96  15  129/83 H  97


 


 01/15/21 09:11   98  16  145/102 H  95


 


 01/15/21 08:55  36.6 C  110 H  20  162/102 H  95














- Physical Exam


General Appearance: positive: No acute distress, Alert


Eyes Bilateral: positive: Normal inspection, PERRL, EOMI


ENT: positive: No signs of dehydration


Neck: positive: No JVD, Trachea midline


Respiratory: positive: No respiratory distress


Cardiovascular: positive: Irregularly irregular (history afib)


Abdomen: positive: Other (minimal distension and tenderness, soft.  no hernias)


Extremities: positive: No pedal edema


Neurologic/Psychiatric: positive: Oriented x3





Results





- Lab Results


Fish Bones: 


                                 01/15/21 09:41





                                 01/15/21 09:41


Other Lab Results: 





                               Lab Results x24hrs











  01/15/21 01/15/21 01/15/21 Range/Units





  11:54 09:41 09:41 


 


WBC     (4.8-10.8)  x10^3/uL


 


RBC     (4.20-5.40)  10^6/uL


 


Hgb     (12.0-16.0)  g/dL


 


Hct     (37.0-47.0)  %


 


MCV     (81.0-99.0)  fL


 


MCH     (27.0-31.0)  pg


 


MCHC     (32.0-36.0)  g/dL


 


RDW     (12.0-15.0)  %


 


Plt Count     (130-450)  10^3/uL


 


MPV     (7.9-10.8)  fL


 


Neut # (Auto)     (1.5-6.6)  10^3/uL


 


Lymph # (Auto)     (1.5-3.5)  10^3/uL


 


Mono # (Auto)     (0.0-1.0)  10^3/uL


 


Eos # (Auto)     (0.0-0.7)  10^3/uL


 


Baso # (Auto)     (0.0-0.1)  10^3/uL


 


Absolute Nucleated RBC     x10^3/uL


 


Nucleated RBC %     /100WBC


 


PT   47.9 H   (9.9-12.6)  secs


 


INR   4.7 H*   (0.8-1.2)  


 


Sodium    140  (135-145)  mmol/L


 


Potassium    3.6  (3.5-5.0)  mmol/L


 


Chloride    103  (101-111)  mmol/L


 


Carbon Dioxide    27  (21-32)  mmol/L


 


Anion Gap    10.0  (6-13)  


 


BUN    13  (6-20)  mg/dL


 


Creatinine    0.8  (0.4-1.0)  mg/dL


 


Estimated GFR (MDRD)    69 L  (>89)  


 


Glucose    102 H  ()  mg/dL


 


Calcium    8.7  (8.5-10.3)  mg/dL


 


Magnesium    2.0  (1.7-2.8)  mg/dL


 


Total Bilirubin    0.8  (0.2-1.0)  mg/dL


 


AST    21  (10-42)  IU/L


 


ALT    19  (10-60)  IU/L


 


Alkaline Phosphatase    160 H  ()  IU/L


 


Total Protein    7.1  (6.7-8.2)  g/dL


 


Albumin    3.9  (3.2-5.5)  g/dL


 


Globulin    3.2  (2.1-4.2)  g/dL


 


Albumin/Globulin Ratio    1.2  (1.0-2.2)  


 


Lipase    45  (22-51)  U/L


 


Urine Color     


 


Urine Clarity     (CLEAR)  


 


Urine pH     (5.0-7.5)  PH


 


Ur Specific Gravity     (1.002-1.030)  


 


Urine Protein     (NEGATIVE)  mg/dL


 


Urine Glucose (UA)     (NEGATIVE)  mg/dL


 


Urine Ketones     (NEGATIVE)  mg/dL


 


Urine Occult Blood     (NEGATIVE)  


 


Urine Nitrite     (NEGATIVE)  


 


Urine Bilirubin     (NEGATIVE)  


 


Urine Urobilinogen     (NORMAL)  E.U./dL


 


Ur Leukocyte Esterase     (NEGATIVE)  


 


Ur Microscopic Review     


 


Urine Culture Comments     


 


Blood Type  A POSITIVE    














  01/15/21 01/15/21 Range/Units





  09:41 09:35 


 


WBC  7.8   (4.8-10.8)  x10^3/uL


 


RBC  5.10   (4.20-5.40)  10^6/uL


 


Hgb  15.7   (12.0-16.0)  g/dL


 


Hct  48.6 H   (37.0-47.0)  %


 


MCV  95.3   (81.0-99.0)  fL


 


MCH  30.8   (27.0-31.0)  pg


 


MCHC  32.3   (32.0-36.0)  g/dL


 


RDW  13.5   (12.0-15.0)  %


 


Plt Count  250   (130-450)  10^3/uL


 


MPV  10.1   (7.9-10.8)  fL


 


Neut # (Auto)  6.0   (1.5-6.6)  10^3/uL


 


Lymph # (Auto)  1.1 L   (1.5-3.5)  10^3/uL


 


Mono # (Auto)  0.6   (0.0-1.0)  10^3/uL


 


Eos # (Auto)  0.1   (0.0-0.7)  10^3/uL


 


Baso # (Auto)  0.0   (0.0-0.1)  10^3/uL


 


Absolute Nucleated RBC  0.00   x10^3/uL


 


Nucleated RBC %  0.0   /100WBC


 


PT    (9.9-12.6)  secs


 


INR    (0.8-1.2)  


 


Sodium    (135-145)  mmol/L


 


Potassium    (3.5-5.0)  mmol/L


 


Chloride    (101-111)  mmol/L


 


Carbon Dioxide    (21-32)  mmol/L


 


Anion Gap    (6-13)  


 


BUN    (6-20)  mg/dL


 


Creatinine    (0.4-1.0)  mg/dL


 


Estimated GFR (MDRD)    (>89)  


 


Glucose    ()  mg/dL


 


Calcium    (8.5-10.3)  mg/dL


 


Magnesium    (1.7-2.8)  mg/dL


 


Total Bilirubin    (0.2-1.0)  mg/dL


 


AST    (10-42)  IU/L


 


ALT    (10-60)  IU/L


 


Alkaline Phosphatase    ()  IU/L


 


Total Protein    (6.7-8.2)  g/dL


 


Albumin    (3.2-5.5)  g/dL


 


Globulin    (2.1-4.2)  g/dL


 


Albumin/Globulin Ratio    (1.0-2.2)  


 


Lipase    (22-51)  U/L


 


Urine Color   YELLOW  


 


Urine Clarity   CLEAR  (CLEAR)  


 


Urine pH   6.5  (5.0-7.5)  PH


 


Ur Specific Gravity   1.020  (1.002-1.030)  


 


Urine Protein   NEGATIVE  (NEGATIVE)  mg/dL


 


Urine Glucose (UA)   NEGATIVE  (NEGATIVE)  mg/dL


 


Urine Ketones   NEGATIVE  (NEGATIVE)  mg/dL


 


Urine Occult Blood   NEGATIVE  (NEGATIVE)  


 


Urine Nitrite   NEGATIVE  (NEGATIVE)  


 


Urine Bilirubin   NEGATIVE  (NEGATIVE)  


 


Urine Urobilinogen   0.2 (NORMAL)  (NORMAL)  E.U./dL


 


Ur Leukocyte Esterase   NEGATIVE  (NEGATIVE)  


 


Ur Microscopic Review   NOT INDICATED  


 


Urine Culture Comments   NOT INDICATED  


 


Blood Type    














- Diagnostic Imaging Results


Diagnostic Imaging Results: positive: Read independently (ct chronic partial 

small bowel obstruction)





Impression/Plan





- Problem List


Problem List: 





Chronic partial small bowel obstruction with worsening symptoms yesterday.





On warfarin for a fib.





I recommend holding warfarin and npo.  If she is feeling better and can take 

clears may go home.


If she is feeling worse she may need an ng tube.  This is discussed.





If she is not improving, and when her INR is closer to normal plan surgery this 

hospitalization.





If she is feeling improved and can tolerate a clear liquid diet she may go home 

and plan elective exploratory laparoscopy, lysis of adhesions, possible open 

laparotomy.


This is discussed with her.





Will follow daily

## 2021-01-15 NOTE — PHARMACY PROGRESS NOTE
- Best Possible Medication History


Admit Date and Time: 01/15/21 1142


Processed by: Pharmacy


Medication History completed: Yes


Patient Interview: Completed


Secondary Source(s): Written medication list, Physician records (PATIENT 

INTERVIEWED BY PHARMACY. PATIENT ABLE TO CONFIRM HOME MEDICATIONS ), Pharmacy 

records, Insurance records





As the person ultimately responsible for medication therapy, providers are able 

to order a medication from an existing home medication list in [facility] via the 

"Reconcile Routine" prior to Confirmation of that medication by support staff. 

Such practice is discouraged except when the physician, in their clinical 

judgment, deems that a medical need exists for a medication without regard to 

previous use.

## 2021-01-16 LAB
ANION GAP SERPL CALCULATED.4IONS-SCNC: 5 MMOL/L (ref 6–13)
BASOPHILS NFR BLD AUTO: 0 10^3/UL (ref 0–0.1)
BASOPHILS NFR BLD AUTO: 0.7 %
BUN SERPL-MCNC: 9 MG/DL (ref 6–20)
CALCIUM UR-MCNC: 8.6 MG/DL (ref 8.5–10.3)
CHLORIDE SERPL-SCNC: 106 MMOL/L (ref 101–111)
CO2 SERPL-SCNC: 29 MMOL/L (ref 21–32)
CREAT SERPLBLD-SCNC: 0.7 MG/DL (ref 0.4–1)
EOSINOPHIL # BLD AUTO: 0.1 10^3/UL (ref 0–0.7)
EOSINOPHIL NFR BLD AUTO: 2.3 %
ERYTHROCYTE [DISTWIDTH] IN BLOOD BY AUTOMATED COUNT: 13.9 % (ref 12–15)
GLUCOSE SERPL-MCNC: 99 MG/DL (ref 70–100)
HGB UR QL STRIP: 13.9 G/DL (ref 12–16)
INR PPP: 1.9 (ref 0.8–1.2)
LYMPHOCYTES # SPEC AUTO: 1.5 10^3/UL (ref 1.5–3.5)
LYMPHOCYTES NFR BLD AUTO: 33.3 %
MCH RBC QN AUTO: 30 PG (ref 27–31)
MCHC RBC AUTO-ENTMCNC: 31 G/DL (ref 32–36)
MCV RBC AUTO: 96.8 FL (ref 81–99)
MONOCYTES # BLD AUTO: 0.5 10^3/UL (ref 0–1)
MONOCYTES NFR BLD AUTO: 12.1 %
NEUTROPHILS # BLD AUTO: 2.3 10^3/UL (ref 1.5–6.6)
NEUTROPHILS # SNV AUTO: 4.4 X10^3/UL (ref 4.8–10.8)
NEUTROPHILS NFR BLD AUTO: 51.4 %
PDW BLD AUTO: 10.6 FL (ref 7.9–10.8)
PLATELET # BLD: 242 10^3/UL (ref 130–450)
PROTHROM ACT/NOR PPP: 20.1 SECS (ref 9.9–12.6)
RBC MAR: 4.64 10^6/UL (ref 4.2–5.4)

## 2021-01-16 RX ADMIN — SODIUM CHLORIDE, PRESERVATIVE FREE SCH: 5 INJECTION INTRAVENOUS at 17:11

## 2021-01-16 RX ADMIN — SODIUM CHLORIDE, PRESERVATIVE FREE SCH: 5 INJECTION INTRAVENOUS at 08:32

## 2021-01-16 RX ADMIN — MORPHINE SULFATE PRN MG: 2 INJECTION, SOLUTION INTRAMUSCULAR; INTRAVENOUS at 17:24

## 2021-01-16 RX ADMIN — SODIUM CHLORIDE, PRESERVATIVE FREE PRN ML: 5 INJECTION INTRAVENOUS at 06:31

## 2021-01-16 RX ADMIN — ONDANSETRON PRN MG: 2 INJECTION INTRAMUSCULAR; INTRAVENOUS at 17:29

## 2021-01-16 RX ADMIN — MORPHINE SULFATE PRN MG: 2 INJECTION, SOLUTION INTRAMUSCULAR; INTRAVENOUS at 08:55

## 2021-01-16 RX ADMIN — SODIUM CHLORIDE, PRESERVATIVE FREE SCH: 5 INJECTION INTRAVENOUS at 01:53

## 2021-01-16 RX ADMIN — METOPROLOL TARTRATE SCH MG: 5 INJECTION INTRAVENOUS at 19:30

## 2021-01-16 RX ADMIN — HYDROCODONE BITARTRATE AND ACETAMINOPHEN PRN TAB: 7.5; 325 TABLET ORAL at 19:42

## 2021-01-16 RX ADMIN — ACETAMINOPHEN PRN MG: 325 TABLET ORAL at 04:12

## 2021-01-16 RX ADMIN — MORPHINE SULFATE PRN MG: 2 INJECTION, SOLUTION INTRAMUSCULAR; INTRAVENOUS at 11:51

## 2021-01-16 RX ADMIN — HYDROCODONE BITARTRATE AND ACETAMINOPHEN PRN TAB: 7.5; 325 TABLET ORAL at 13:50

## 2021-01-16 RX ADMIN — POTASSIUM CHLORIDE, DEXTROSE MONOHYDRATE AND SODIUM CHLORIDE SCH MLS/HR: 150; 5; 450 INJECTION, SOLUTION INTRAVENOUS at 11:51

## 2021-01-16 RX ADMIN — MORPHINE SULFATE PRN MG: 2 INJECTION, SOLUTION INTRAMUSCULAR; INTRAVENOUS at 04:16

## 2021-01-16 RX ADMIN — POTASSIUM CHLORIDE, DEXTROSE MONOHYDRATE AND SODIUM CHLORIDE SCH: 150; 5; 450 INJECTION, SOLUTION INTRAVENOUS at 10:33

## 2021-01-16 RX ADMIN — POTASSIUM CHLORIDE, DEXTROSE MONOHYDRATE AND SODIUM CHLORIDE SCH MLS/HR: 150; 5; 450 INJECTION, SOLUTION INTRAVENOUS at 21:59

## 2021-01-16 RX ADMIN — SODIUM CHLORIDE SCH MG: 9 INJECTION, SOLUTION INTRAVENOUS at 06:31

## 2021-01-16 RX ADMIN — CYCLOBENZAPRINE SCH MG: 10 TABLET, FILM COATED ORAL at 20:17

## 2021-01-16 RX ADMIN — POTASSIUM CHLORIDE, DEXTROSE MONOHYDRATE AND SODIUM CHLORIDE SCH MLS/HR: 150; 5; 450 INJECTION, SOLUTION INTRAVENOUS at 01:52

## 2021-01-16 RX ADMIN — GABAPENTIN SCH MG: 400 CAPSULE ORAL at 20:17

## 2021-01-16 NOTE — PROVIDER PROGRESS NOTE
Assessment/Plan





- Problem List


(1) Small bowel obstruction


Assessment/Plan: 


Patient continues to be n.p.o. except for her medications.


IV hydration with D5 plus half normal saline +20 mEq of potassium chloride at 

100 mils per hour.


Pain management with Tylenol, Norco and morphine as needed.


Dr. Chepe Ortega with general surgery following.


He plans to take the patient to the OR for  an elective exploratory laparoscopy,




lysis of adhesions and possible open laparotomy on Monday, 1/18/2021.








(2) Supratherapeutic INR


Assessment/Plan: 


His INR was 4.7.


Her Coumadin was held and she received a dose of vitamin K IV.


Recheck of INR today was 1.9.








(3) Chronic atrial fibrillation


Assessment/Plan: 


On metoprolol 5 mg IV every 6 hours.








(4) Hypothyroidism


Assessment/Plan: 


On liothyronine 5 mcg p.o. daily.  Will continue.











(6) Hypertension


Assessment/Plan: 


Currently elevated.  Likely worsened by abdominal pain.


On metoprolol tartrate IV every 6 hours.


Patient receiving pain management with Tylenol, Norco and/or morphine.








(7) Leukopenia


Assessment/Plan: 


Reactive versus infectious.


Patient's white blood cell count is 4.4.


Patient is afebrile.  Will monitor.


If it worsens and/or patient becomes febrile will consider further work up to 

include


 blood cultures and empiric antibiotics.








- Current Meds


Current Meds: 





                               Current Medications











Generic Name Dose Route Start Last Admin





  Trade Name Freq  PRN Reason Stop Dose Admin


 


Acetaminophen  650 mg  01/15/21 11:42  01/16/21 04:12





  Acetaminophen 325 Mg Tablet  PO   650 mg





  Q4HR PRN   Administration





  Pain 1 to 4  


 


Potassium Chloride/Dextrose/Sod Cl  1,000 mls @ 100 mls/hr  01/15/21 12:00  

01/16/21 10:33





  D5.45ns W/20 Meq Kcl  IV   Not Given





  .Q10H PAVAN  


 


Morphine Sulfate  2 mg  01/15/21 11:42  01/16/21 08:55





  Morphine 2 Mg/Ml Carpuject  IVP   2 mg





  Q2HR PRN   Administration





  Pain 8 to 10  


 


Pantoprazole Sodium  40 mg  01/16/21 07:00  01/16/21 06:31





  Pantoprazole 40 Mg Vial  IVP   40 mg





  QDAC PAVAN   Administration


 


Sodium Chloride  10 ml  01/15/21 11:42  01/16/21 06:31





  Sodium Chloride Flush 0.9% 10 Ml Syringe  IVP   10 ml





  PRN PRN   Administration





  AS NEEDED PER PROVIDER ORDERS  


 


Sodium Chloride  10 ml  01/15/21 17:00  01/16/21 08:32





  Sodium Chloride Flush 0.9% 10 Ml Syringe  IVP   Not Given





  0100,0900,1700 PAVAN  














- Lab Result


Fish Bone Diagrams: 


                                 01/16/21 04:06





                                 01/16/21 04:06





Subjective





- Subjective


Patient Reports: Other (Patient complains of headache and abdominal pain today. 

She reports her abdominal pain 7 out of 10.)





Objective


Vital Signs: 





                               Vital Signs - 24 hr











  01/15/21 01/15/21 01/15/21





  11:05 12:00 13:10


 


Temperature 36.6 C  36.6 C


 


Heart Rate 105 H 104 H 109 H


 


Heart Rate [   





Brachial]   


 


Respiratory 14 12 21





Rate   


 


Blood Pressure 158/87 H 158/95 H 128/82 H


 


Blood Pressure   





[Right Brachial   





artery]   


 


O2 Saturation 98 106 H 














  01/15/21 01/15/21 01/15/21





  13:20 13:28 13:30


 


Temperature 36.0 C L 36.4 C L 36.5 C


 


Heart Rate 92 103 H 99


 


Heart Rate [   





Brachial]   


 


Respiratory 18 23 23





Rate   


 


Blood Pressure 160/86 H 147/82 H 152/92 H


 


Blood Pressure   





[Right Brachial   





artery]   


 


O2 Saturation   














  01/15/21 01/15/21 01/15/21





  14:01 14:58 15:10


 


Temperature   37.1 C


 


Heart Rate 102 H  


 


Heart Rate [   77





Brachial]   


 


Respiratory 20  16





Rate   


 


Blood Pressure 152/92 H 144/83 H 


 


Blood Pressure   128/76





[Right Brachial   





artery]   


 


O2 Saturation 97  97














  01/15/21 01/15/21 01/15/21





  15:15 16:00 21:19


 


Temperature  37.1 C 37.5 C


 


Heart Rate   


 


Heart Rate [ 82 98 61





Brachial]   


 


Respiratory  18 18





Rate   


 


Blood Pressure   


 


Blood Pressure 133/77 H 137/96 H 146/92 H





[Right Brachial   





artery]   


 


O2 Saturation  93 95














  01/16/21 01/16/21 01/16/21





  00:00 04:10 04:46


 


Temperature 37 C 36.5 C 


 


Heart Rate   


 


Heart Rate [ 91 93 81





Brachial]   


 


Respiratory 16 16 





Rate   


 


Blood Pressure   


 


Blood Pressure 126/99 H 173/91 H 141/68 H





[Right Brachial   





artery]   


 


O2 Saturation 94 99 














  01/16/21





  08:44


 


Temperature 36.6 C


 


Heart Rate 


 


Heart Rate [ 80





Brachial] 


 


Respiratory 20





Rate 


 


Blood Pressure 


 


Blood Pressure 153/91 H





[Right Brachial 





artery] 


 


O2 Saturation 97








                                     Oxygen











O2 Source [With Activity]      Room air


 


O2 Source [Without Activity]   Room air


 


O2 Source                      Room air














I&O (Last 24 Hrs): 





                          Intake and Output Totals x24h











 01/14/21 01/15/21 01/16/21





 23:59 23:59 23:59


 


Intake Total  1793 1000


 


Balance  1793 1000











General: Alert, Oriented x3, Mild distress, Moderate distress


HEENT: Atraumatic, PERRLA, EOMI


Neck: No JVD


Neuro: Alert, Non Focal, Oriented Times 3


Cardiovascular: Regular rate, Normal S1, Normal S2


Respiratory: Chest non-tender, No respiratory distress, Breath sounds nml


Abdomen: Soft, Other (Tender to palpation)


Extremities: No clubbing, No cyanosis, No edema


Skin: No rashes





- Results


Results: 





                               Laboratory Results











WBC  4.4 x10^3/uL (4.8-10.8)  L  01/16/21  04:06    


 


RBC  4.64 10^6/uL (4.20-5.40)   01/16/21  04:06    


 


Hgb  13.9 g/dL (12.0-16.0)   01/16/21  04:06    


 


Hct  44.9 % (37.0-47.0)   01/16/21  04:06    


 


MCV  96.8 fL (81.0-99.0)   01/16/21  04:06    


 


MCH  30.0 pg (27.0-31.0)   01/16/21  04:06    


 


MCHC  31.0 g/dL (32.0-36.0)  L  01/16/21  04:06    


 


RDW  13.9 % (12.0-15.0)   01/16/21  04:06    


 


Plt Count  242 10^3/uL (130-450)   01/16/21  04:06    


 


MPV  10.6 fL (7.9-10.8)   01/16/21  04:06    


 


Neut # (Auto)  2.3 10^3/uL (1.5-6.6)   01/16/21  04:06    


 


Lymph # (Auto)  1.5 10^3/uL (1.5-3.5)   01/16/21  04:06    


 


Mono # (Auto)  0.5 10^3/uL (0.0-1.0)   01/16/21  04:06    


 


Eos # (Auto)  0.1 10^3/uL (0.0-0.7)   01/16/21  04:06    


 


Baso # (Auto)  0.0 10^3/uL (0.0-0.1)   01/16/21  04:06    


 


Absolute Nucleated RBC  0.00 x10^3/uL  01/16/21  04:06    


 


Nucleated RBC %  0.0 /100WBC  01/16/21  04:06    


 


PT  20.1 secs (9.9-12.6)  H  01/16/21  04:06    


 


INR  1.9  (0.8-1.2)  H  01/16/21  04:06    


 


Sodium  140 mmol/L (135-145)   01/16/21  04:06    


 


Potassium  3.8 mmol/L (3.5-5.0)   01/16/21  04:06    


 


Chloride  106 mmol/L (101-111)   01/16/21  04:06    


 


Carbon Dioxide  29 mmol/L (21-32)   01/16/21  04:06    


 


Anion Gap  5.0  (6-13)  L  01/16/21  04:06    


 


BUN  9 mg/dL (6-20)   01/16/21  04:06    


 


Creatinine  0.7 mg/dL (0.4-1.0)   01/16/21  04:06    


 


Estimated GFR (MDRD)  81  (>89)  L  01/16/21  04:06    


 


Glucose  99 mg/dL ()   01/16/21  04:06    


 


Calcium  8.6 mg/dL (8.5-10.3)   01/16/21  04:06    


 


Magnesium  2.0 mg/dL (1.7-2.8)   01/15/21  09:41    


 


Total Bilirubin  0.8 mg/dL (0.2-1.0)   01/15/21  09:41    


 


AST  21 IU/L (10-42)   01/15/21  09:41    


 


ALT  19 IU/L (10-60)   01/15/21  09:41    


 


Alkaline Phosphatase  160 IU/L ()  H  01/15/21  09:41    


 


Total Protein  7.1 g/dL (6.7-8.2)   01/15/21  09:41    


 


Albumin  3.9 g/dL (3.2-5.5)   01/15/21  09:41    


 


Globulin  3.2 g/dL (2.1-4.2)   01/15/21  09:41    


 


Albumin/Globulin Ratio  1.2  (1.0-2.2)   01/15/21  09:41    


 


Lipase  45 U/L (22-51)   01/15/21  09:41    


 


TSH  4.36 uIU/mL (0.34-5.60)   01/16/21  04:06    


 


Urine Color  YELLOW   01/15/21  09:35    


 


Urine Clarity  CLEAR  (CLEAR)   01/15/21  09:35    


 


Urine pH  6.5 PH (5.0-7.5)   01/15/21  09:35    


 


Ur Specific Gravity  1.020  (1.002-1.030)   01/15/21  09:35    


 


Urine Protein  NEGATIVE mg/dL (NEGATIVE)   01/15/21  09:35    


 


Urine Glucose (UA)  NEGATIVE mg/dL (NEGATIVE)   01/15/21  09:35    


 


Urine Ketones  NEGATIVE mg/dL (NEGATIVE)   01/15/21  09:35    


 


Urine Occult Blood  NEGATIVE  (NEGATIVE)   01/15/21  09:35    


 


Urine Nitrite  NEGATIVE  (NEGATIVE)   01/15/21  09:35    


 


Urine Bilirubin  NEGATIVE  (NEGATIVE)   01/15/21  09:35    


 


Urine Urobilinogen  0.2 (NORMAL) E.U./dL (NORMAL)   01/15/21  09:35    


 


Ur Leukocyte Esterase  NEGATIVE  (NEGATIVE)   01/15/21  09:35    


 


Ur Microscopic Review  NOT INDICATED   01/15/21  09:35    


 


Urine Culture Comments  NOT INDICATED   01/15/21  09:35    


 


Nasal Adenovirus (PCR)  NOT DETECTED   01/15/21  12:55    


 


Nasal B. parapertussis DNA (PCR)  NOT DETECTED   01/15/21  12:55    


 


Nasal Coronavir 229E PCR  NOT DETECTED   01/15/21  12:55    


 


Nasal Coronavir HKU1 PCR  NOT DETECTED   01/15/21  12:55    


 


Nasal Coronavir NL63 PCR  NOT DETECTED   01/15/21  12:55    


 


Nasal Coronavir OC43 PCR  NOT DETECTED   01/15/21  12:55    


 


Nasal Enterovir/Rhinovir PCR  NOT DETECTED   01/15/21  12:55    


 


Nasal Influenza B PCR  NOT DETECTED   01/15/21  12:55    


 


Nasal Influenza A PCR  NOT DETECTED   01/15/21  12:55    


 


Nasal Parainfluen 1 PCR  NOT DETECTED   01/15/21  12:55    


 


Nasal Parainfluen 2 PCR  NOT DETECTED   01/15/21  12:55    


 


Nasal Parainfluen 3 PCR  NOT DETECTED   01/15/21  12:55    


 


Nasal Parainfluen 4 PCR  NOT DETECTED   01/15/21  12:55    


 


Nasal RSV (PCR)  NOT DETECTED   01/15/21  12:55    


 


Nasal B.pertussis DNA PCR  NOT DETECTED   01/15/21  12:55    


 


Nasal C.pneumoniae (PCR)  NOT DETECTED   01/15/21  12:55    


 


Brody Human Metapneumo PCR  NOT DETECTED   01/15/21  12:55    


 


Nasal M.pneumoniae (PCR)  NOT DETECTED   01/15/21  12:55    


 


Nasal SARS-CoV-2 (PCR)  NOT DETECTED   01/15/21  12:55    


 


Blood Type  A POSITIVE   01/15/21  11:54    














- Procedures


Procedures: 





Procedures





ARTHO OF CARPOCARP OR CARPOMETACARP JT W/O IMPLANT (12/13/13)


COLONOSCOPY (09/11/14)


ESOPHAGOGASTRODUODENOSCOPY [EGD] W/CLOSED BIOPSY (09/11/14)


EXCISION OF STOMACH, ENDO, DIAGN (12/09/15)


EXCISION OF STOMACH, PYLORUS, ENDO, DIAGN (06/04/16)


INSPECTION OF UPPER INTESTINAL TRACT, ENDO (04/20/16)


OTHER ENDOSCOPY OF SM INTEST (12/15/14)


TRANSFUSE NONAUT FROZEN PLASMA IN PERIPH VEIN, PERC (06/04/16)


TRANSFUSE NONAUT RED BLOOD CELLS IN PERIPH VEIN, PERC (12/09/15)


TRANSPOSIT HAND TEND NEC (12/13/13)











ABX Reporting


Has patient been on IV antibiotics over the past 48 hours?: No

## 2021-01-16 NOTE — PROVIDER PROGRESS NOTE
Subjective





- Prog Note Date


Prog Note Date: 01/16/21





- Subjective


Pt reports feeling: No change (minimal pain.  no flatus.  no nauea. no appetite)





Objective





- Vital Signs/Intake & Output


Reviewed Vital Signs: Yes


Vital Signs: 


                                Vital Signs x48h











  Temp Pulse Resp BP Pulse Ox


 


 01/16/21 08:44  36.6 C  80  20  153/91 H  97


 


 01/16/21 04:46   81   141/68 H 


 


 01/16/21 04:10  36.5 C  93  16  173/91 H  99











Intake & Output: 


                                 Intake & Output











 01/13/21 01/14/21 01/15/21 01/16/21





 23:59 23:59 23:59 23:59


 


Intake Total   1793 1998.333


 


Balance   1793 1998.333














- Objective


General Appearance: positive: No acute distress, Alert


Eyes Bilateral: positive: Normal inspection, PERRL, EOMI


ENT: positive: No signs of dehydration


Neck: positive: No JVD


Respiratory: positive: No respiratory distress


Abdomen: positive: Non-tender, No distention





- Lab Results


Fish Bones: 


                                 01/16/21 04:06





                                 01/16/21 04:06


Other Labs: 


                               Lab Results x24hrs











  01/16/21 01/16/21 01/16/21 Range/Units





  04:06 04:06 04:06 


 


WBC    4.4 L  (4.8-10.8)  x10^3/uL


 


RBC    4.64  (4.20-5.40)  10^6/uL


 


Hgb    13.9  (12.0-16.0)  g/dL


 


Hct    44.9  (37.0-47.0)  %


 


MCV    96.8  (81.0-99.0)  fL


 


MCH    30.0  (27.0-31.0)  pg


 


MCHC    31.0 L  (32.0-36.0)  g/dL


 


RDW    13.9  (12.0-15.0)  %


 


Plt Count    242  (130-450)  10^3/uL


 


MPV    10.6  (7.9-10.8)  fL


 


Neut # (Auto)    2.3  (1.5-6.6)  10^3/uL


 


Lymph # (Auto)    1.5  (1.5-3.5)  10^3/uL


 


Mono # (Auto)    0.5  (0.0-1.0)  10^3/uL


 


Eos # (Auto)    0.1  (0.0-0.7)  10^3/uL


 


Baso # (Auto)    0.0  (0.0-0.1)  10^3/uL


 


Absolute Nucleated RBC    0.00  x10^3/uL


 


Nucleated RBC %    0.0  /100WBC


 


PT     (9.9-12.6)  secs


 


INR     (0.8-1.2)  


 


Sodium   140   (135-145)  mmol/L


 


Potassium   3.8   (3.5-5.0)  mmol/L


 


Chloride   106   (101-111)  mmol/L


 


Carbon Dioxide   29   (21-32)  mmol/L


 


Anion Gap   5.0 L   (6-13)  


 


BUN   9   (6-20)  mg/dL


 


Creatinine   0.7   (0.4-1.0)  mg/dL


 


Estimated GFR (MDRD)   81 L   (>89)  


 


Glucose   99   ()  mg/dL


 


Calcium   8.6   (8.5-10.3)  mg/dL


 


TSH  4.36    (0.34-5.60)  uIU/mL


 


Nasal Adenovirus (PCR)     


 


Nasal B. parapertussis DNA (PCR)     


 


Nasal Coronavir 229E PCR     


 


Nasal Coronavir HKU1 PCR     


 


Nasal Coronavir NL63 PCR     


 


Nasal Coronavir OC43 PCR     


 


Nasal Enterovir/Rhinovir PCR     


 


Nasal Influenza B PCR     


 


Nasal Influenza A PCR     


 


Nasal Parainfluen 1 PCR     


 


Nasal Parainfluen 2 PCR     


 


Nasal Parainfluen 3 PCR     


 


Nasal Parainfluen 4 PCR     


 


Nasal RSV (PCR)     


 


Nasal B.pertussis DNA PCR     


 


Nasal C.pneumoniae (PCR)     


 


Brody Human Metapneumo PCR     


 


Nasal M.pneumoniae (PCR)     


 


Nasal SARS-CoV-2 (PCR)     


 


Blood Type     














  01/16/21 01/15/21 01/15/21 Range/Units





  04:06 12:55 11:54 


 


WBC     (4.8-10.8)  x10^3/uL


 


RBC     (4.20-5.40)  10^6/uL


 


Hgb     (12.0-16.0)  g/dL


 


Hct     (37.0-47.0)  %


 


MCV     (81.0-99.0)  fL


 


MCH     (27.0-31.0)  pg


 


MCHC     (32.0-36.0)  g/dL


 


RDW     (12.0-15.0)  %


 


Plt Count     (130-450)  10^3/uL


 


MPV     (7.9-10.8)  fL


 


Neut # (Auto)     (1.5-6.6)  10^3/uL


 


Lymph # (Auto)     (1.5-3.5)  10^3/uL


 


Mono # (Auto)     (0.0-1.0)  10^3/uL


 


Eos # (Auto)     (0.0-0.7)  10^3/uL


 


Baso # (Auto)     (0.0-0.1)  10^3/uL


 


Absolute Nucleated RBC     x10^3/uL


 


Nucleated RBC %     /100WBC


 


PT  20.1 H    (9.9-12.6)  secs


 


INR  1.9 H    (0.8-1.2)  


 


Sodium     (135-145)  mmol/L


 


Potassium     (3.5-5.0)  mmol/L


 


Chloride     (101-111)  mmol/L


 


Carbon Dioxide     (21-32)  mmol/L


 


Anion Gap     (6-13)  


 


BUN     (6-20)  mg/dL


 


Creatinine     (0.4-1.0)  mg/dL


 


Estimated GFR (MDRD)     (>89)  


 


Glucose     ()  mg/dL


 


Calcium     (8.5-10.3)  mg/dL


 


TSH     (0.34-5.60)  uIU/mL


 


Nasal Adenovirus (PCR)   NOT DETECTED   


 


Nasal B. parapertussis DNA (PCR)   NOT DETECTED   


 


Nasal Coronavir 229E PCR   NOT DETECTED   


 


Nasal Coronavir HKU1 PCR   NOT DETECTED   


 


Nasal Coronavir NL63 PCR   NOT DETECTED   


 


Nasal Coronavir OC43 PCR   NOT DETECTED   


 


Nasal Enterovir/Rhinovir PCR   NOT DETECTED   


 


Nasal Influenza B PCR   NOT DETECTED   


 


Nasal Influenza A PCR   NOT DETECTED   


 


Nasal Parainfluen 1 PCR   NOT DETECTED   


 


Nasal Parainfluen 2 PCR   NOT DETECTED   


 


Nasal Parainfluen 3 PCR   NOT DETECTED   


 


Nasal Parainfluen 4 PCR   NOT DETECTED   


 


Nasal RSV (PCR)   NOT DETECTED   


 


Nasal B.pertussis DNA PCR   NOT DETECTED   


 


Nasal C.pneumoniae (PCR)   NOT DETECTED   


 


Brody Human Metapneumo PCR   NOT DETECTED   


 


Nasal M.pneumoniae (PCR)   NOT DETECTED   


 


Nasal SARS-CoV-2 (PCR)   NOT DETECTED   


 


Blood Type    A POSITIVE  














Assessment/Plan





- Problem List


(1) Small bowel obstruction


Impression: 


Chronic partial small bowel obstruction, progressive over months.  INR is coming

down towards normal.





Plan surgery 1/18 afternoon to relieve/ resect the area of chronic obstruction. 







Agree with present care.

## 2021-01-17 LAB
ANION GAP SERPL CALCULATED.4IONS-SCNC: 8 MMOL/L (ref 6–13)
BASOPHILS NFR BLD AUTO: 0 10^3/UL (ref 0–0.1)
BASOPHILS NFR BLD AUTO: 0.4 %
BUN SERPL-MCNC: 7 MG/DL (ref 6–20)
CALCIUM UR-MCNC: 8.7 MG/DL (ref 8.5–10.3)
CHLORIDE SERPL-SCNC: 105 MMOL/L (ref 101–111)
CO2 SERPL-SCNC: 27 MMOL/L (ref 21–32)
CREAT SERPLBLD-SCNC: 0.8 MG/DL (ref 0.4–1)
EOSINOPHIL # BLD AUTO: 0.1 10^3/UL (ref 0–0.7)
EOSINOPHIL NFR BLD AUTO: 2.5 %
ERYTHROCYTE [DISTWIDTH] IN BLOOD BY AUTOMATED COUNT: 14 % (ref 12–15)
GLUCOSE SERPL-MCNC: 104 MG/DL (ref 70–100)
HGB UR QL STRIP: 12.5 G/DL (ref 12–16)
INR PPP: 1.4 (ref 0.8–1.2)
LYMPHOCYTES # SPEC AUTO: 1.1 10^3/UL (ref 1.5–3.5)
LYMPHOCYTES NFR BLD AUTO: 21 %
MCH RBC QN AUTO: 30 PG (ref 27–31)
MCHC RBC AUTO-ENTMCNC: 30.9 G/DL (ref 32–36)
MCV RBC AUTO: 97.4 FL (ref 81–99)
MONOCYTES # BLD AUTO: 0.6 10^3/UL (ref 0–1)
MONOCYTES NFR BLD AUTO: 12.1 %
NEUTROPHILS # BLD AUTO: 3.4 10^3/UL (ref 1.5–6.6)
NEUTROPHILS # SNV AUTO: 5.3 X10^3/UL (ref 4.8–10.8)
NEUTROPHILS NFR BLD AUTO: 63.8 %
PDW BLD AUTO: 10.5 FL (ref 7.9–10.8)
PLATELET # BLD: 207 10^3/UL (ref 130–450)
PROTHROM ACT/NOR PPP: 15.2 SECS (ref 9.9–12.6)
RBC MAR: 4.16 10^6/UL (ref 4.2–5.4)

## 2021-01-17 RX ADMIN — SODIUM CHLORIDE, PRESERVATIVE FREE SCH ML: 5 INJECTION INTRAVENOUS at 23:49

## 2021-01-17 RX ADMIN — METOPROLOL TARTRATE SCH MG: 5 INJECTION INTRAVENOUS at 01:10

## 2021-01-17 RX ADMIN — ACETAMINOPHEN PRN MG: 325 TABLET ORAL at 20:04

## 2021-01-17 RX ADMIN — GABAPENTIN SCH MG: 400 CAPSULE ORAL at 20:04

## 2021-01-17 RX ADMIN — CYCLOBENZAPRINE SCH MG: 10 TABLET, FILM COATED ORAL at 20:04

## 2021-01-17 RX ADMIN — METOPROLOL TARTRATE SCH MG: 5 INJECTION INTRAVENOUS at 23:48

## 2021-01-17 RX ADMIN — POTASSIUM CHLORIDE, DEXTROSE MONOHYDRATE AND SODIUM CHLORIDE SCH MLS/HR: 150; 5; 450 INJECTION, SOLUTION INTRAVENOUS at 18:01

## 2021-01-17 RX ADMIN — SODIUM CHLORIDE, PRESERVATIVE FREE SCH: 5 INJECTION INTRAVENOUS at 16:21

## 2021-01-17 RX ADMIN — METOPROLOL TARTRATE SCH MG: 5 INJECTION INTRAVENOUS at 18:01

## 2021-01-17 RX ADMIN — ACETAMINOPHEN PRN MG: 325 TABLET ORAL at 15:40

## 2021-01-17 RX ADMIN — SODIUM CHLORIDE, PRESERVATIVE FREE SCH: 5 INJECTION INTRAVENOUS at 08:16

## 2021-01-17 RX ADMIN — POTASSIUM CHLORIDE, DEXTROSE MONOHYDRATE AND SODIUM CHLORIDE SCH MLS/HR: 150; 5; 450 INJECTION, SOLUTION INTRAVENOUS at 08:05

## 2021-01-17 RX ADMIN — SODIUM CHLORIDE SCH MG: 9 INJECTION, SOLUTION INTRAVENOUS at 06:35

## 2021-01-17 RX ADMIN — SODIUM CHLORIDE, PRESERVATIVE FREE SCH: 5 INJECTION INTRAVENOUS at 01:15

## 2021-01-17 RX ADMIN — LIOTHYRONINE SODIUM SCH MCG: 5 TABLET ORAL at 08:40

## 2021-01-17 RX ADMIN — METOPROLOL TARTRATE SCH MG: 5 INJECTION INTRAVENOUS at 06:35

## 2021-01-17 RX ADMIN — METOPROLOL TARTRATE SCH MG: 5 INJECTION INTRAVENOUS at 13:03

## 2021-01-17 RX ADMIN — HYDROCODONE BITARTRATE AND ACETAMINOPHEN PRN TAB: 7.5; 325 TABLET ORAL at 08:31

## 2021-01-17 NOTE — PROVIDER PROGRESS NOTE
Assessment/Plan





- Problem List


(1) Small bowel obstruction


Assessment/Plan: 


Patient continues to be n.p.o. except for her medications.


IV hydration with D5 plus half normal saline +20 mEq of potassium chloride at 

100 mils per hour.


Pain management with Tylenol, Norco and morphine as needed.


Dr. Chepe Ortega with general surgery following.


Surgery today was successful and on eventful.








(2) Supratherapeutic INR


Assessment/Plan: 


His INR was 4.7.


Her Coumadin was held and she received a dose of vitamin K IV.


Recheck of INR today was 1.5.








(3) Chronic atrial fibrillation


Assessment/Plan: 


On metoprolol 5 mg IV every 6 hours.








(4) Hypothyroidism


Assessment/Plan: 


On liothyronine 5 mcg p.o. daily.  Will continue.














(6) Hypertension


Assessment/Plan: 


Currently elevated.  Likely worsened by abdominal pain.


On metoprolol tartrate IV every 6 hours.


Patient receiving pain management with Tylenol, Norco and/or morphine.











(7) Leukopenia


Assessment/Plan: 


Reactive versus infectious.


Patient's white blood cell count is 5.0.


Patient is afebrile.  Will monitor.


If it worsens and/or patient becomes febrile will consider further work up to 

include


 blood cultures and empiric antibiotics.








- Current Meds


Current Meds: 





                               Current Medications











Generic Name Dose Route Start Last Admin





  Trade Name Freq  PRN Reason Stop Dose Admin


 


Acetaminophen  650 mg  01/15/21 11:42  01/16/21 04:12





  Acetaminophen 325 Mg Tablet  PO   650 mg





  Q4HR PRN   Administration





  Pain 1 to 4  


 


Hydrocodone Bitart/Acetaminophen  1 tab  01/16/21 11:46  01/17/21 08:31





  Hydrocod/Acetam 7.5 Mg/325 Mg Tablet  PO   1 tab





  TID PRN   Administration





  PAIN  


 


Cyclobenzaprine HCl  10 mg  01/16/21 21:00  01/16/21 20:17





  Cyclobenzaprine 10 Mg Tablet  PO   10 mg





  QPM PAVAN   Administration


 


Duloxetine HCl  60 mg  01/17/21 09:00  01/17/21 08:23





  Duloxetine 30 Mg Capsule  PO   60 mg





  DAILY PAVAN   Administration


 


Gabapentin  1,200 mg  01/16/21 21:00  01/16/21 20:17





  Gabapentin 400 Mg Capsule  PO   1,200 mg





  QPM PAVAN   Administration


 


Potassium Chloride/Dextrose/Sod Cl  1,000 mls @ 100 mls/hr  01/15/21 12:00  

01/17/21 08:05





  D5.45ns W/20 Meq Kcl  IV   100 mls/hr





  .Q10H PAVAN   Administration


 


Liothyronine Sodium  5 mcg  01/17/21 09:00  01/17/21 08:40





  Liothyronine 5 Mcg Tablet  PO   5 mcg





  DAILY PAVAN   Administration


 


Metoprolol Tartrate  5 mg  01/16/21 19:00  01/17/21 06:35





  Metoprolol 5 Mg/5 Ml Vial  IVP   5 mg





  Q6H PAVAN   Administration


 


Morphine Sulfate  2 mg  01/15/21 11:42  01/16/21 17:24





  Morphine 2 Mg/Ml Carpuject  IVP   2 mg





  Q2HR PRN   Administration





  Pain 8 to 10  


 


Ondansetron HCl  4 mg  01/15/21 11:42  01/16/21 17:29





  Ondansetron 4 Mg/2 Ml Vial  IVP   4 mg





  Q6HR PRN   Administration





  Nausea / Vomiting  


 


Pantoprazole Sodium  40 mg  01/16/21 07:00  01/17/21 06:35





  Pantoprazole 40 Mg Vial  IVP   40 mg





  QDAC PAVAN   Administration


 


Sodium Chloride  10 ml  01/15/21 11:42  01/16/21 06:31





  Sodium Chloride Flush 0.9% 10 Ml Syringe  IVP   10 ml





  PRN PRN   Administration





  AS NEEDED PER PROVIDER ORDERS  


 


Sodium Chloride  10 ml  01/15/21 17:00  01/17/21 08:16





  Sodium Chloride Flush 0.9% 10 Ml Syringe  IVP   Not Given





  0100,0900,1700 PAVAN  


 


Trazodone HCl  100 mg  01/16/21 21:00  01/16/21 20:17





  Trazodone 50 Mg Tablet  PO   100 mg





  QPM PAVAN   Administration














- Lab Result


Fish Bone Diagrams: 


                                 01/18/21 05:21





                                 01/18/21 05:21





- Additional Planning


My Orders: 





My Active Orders





01/16/21 11:46


HYDROcodone/ACET 7.5/325 [[place] 7.5/325]   1 tab PO TID PRN 





01/16/21 19:00


Metoprolol Inj [Lopressor Inj]   5 mg IVP Q6H 





01/16/21 21:00


Cyclobenzaprine [Flexeril]   10 mg PO QPM 


Gabapentin [Neurontin]   1,200 mg PO QPM 


traZODone [Desyrel]   100 mg PO QPM 





01/17/21 09:00


DULoxetine [Cymbalta]   60 mg PO DAILY 


Liothyronine [Cytomel]   5 mcg PO DAILY 














Subjective





- Subjective


Patient Reports: Other (She is abdominal pain persist.  She has not had a bowel 

movement yet.)





Objective


Vital Signs: 





                               Vital Signs - 24 hr











  01/16/21 01/16/21 01/16/21





  12:29 15:55 19:30


 


Temperature 36.7 C 36.9 C 


 


Heart Rate [ 93 83 





Brachial]   


 


Respiratory 18 18 





Rate   


 


Blood Pressure   179/81 H


 


Blood Pressure 155/85 H 155/85 H 





[Right Brachial   





artery]   


 


O2 Saturation 97 95 














  01/16/21 01/16/21 01/16/21





  19:35 19:40 19:45


 


Temperature   


 


Heart Rate [ 83 80 76





Brachial]   


 


Respiratory   





Rate   


 


Blood Pressure   


 


Blood Pressure 169/91 H 159/96 H 179/94 H





[Right Brachial   





artery]   


 


O2 Saturation   














  01/16/21 01/16/21 01/16/21





  20:00 20:15 20:30


 


Temperature   37.2 C


 


Heart Rate [ 87 72 83





Brachial]   


 


Respiratory   18





Rate   


 


Blood Pressure   


 


Blood Pressure 157/74 H 170/87 H 173/85 H





[Right Brachial   





artery]   


 


O2 Saturation   100














  01/17/21 01/17/21 01/17/21





  00:00 01:10 01:15


 


Temperature 36.9 C  


 


Heart Rate [ 94  77





Brachial]   


 


Respiratory 18  





Rate   


 


Blood Pressure  135/69 H 


 


Blood Pressure 147/81 H  133/65 H





[Right Brachial   





artery]   


 


O2 Saturation 95  














  01/17/21 01/17/21 01/17/21





  01:20 01:25 01:40


 


Temperature   


 


Heart Rate [ 80 77 75





Brachial]   


 


Respiratory   





Rate   


 


Blood Pressure   


 


Blood Pressure 134/81 H 129/67 121/65





[Right Brachial   





artery]   


 


O2 Saturation   














  01/17/21 01/17/21 01/17/21





  01:55 02:10 06:35


 


Temperature   


 


Heart Rate [ 77 71 





Brachial]   


 


Respiratory   





Rate   


 


Blood Pressure   143/74 H


 


Blood Pressure 127/66 123/57 L 





[Right Brachial   





artery]   


 


O2 Saturation   














  01/17/21 01/17/21 01/17/21





  06:40 06:45 06:50


 


Temperature   


 


Heart Rate [ 75 84 74





Brachial]   


 


Respiratory   





Rate   


 


Blood Pressure   


 


Blood Pressure 146/80 H 143/100 H 155/74 H





[Right Brachial   





artery]   


 


O2 Saturation   














  01/17/21 01/17/21





  07:16 08:00


 


Temperature  37.1 C


 


Heart Rate [ 75 75





Brachial]  


 


Respiratory  14





Rate  


 


Blood Pressure  


 


Blood Pressure 141/71 H 142/86 H





[Right Brachial  





artery]  


 


O2 Saturation  94








                                     Oxygen











O2 Source [With Activity]      Room air


 


O2 Source [Without Activity]   Room air


 


O2 Source                      Room air














I&O (Last 24 Hrs): 





                          Intake and Output Totals x24h











 01/15/21 01/16/21 01/17/21





 23:59 23:59 23:59


 


Intake Total 1793 2998.333 1000


 


Balance 1793 2998.333 1000











General: Alert, Oriented x3, Moderate distress


HEENT: PERRLA, EOMI


Neck: Supple, No JVD


Neuro: Alert, Non Focal, Oriented Times 3


Cardiovascular: Regular rate, Other (Irregularly irregular heart rhythm)


Respiratory: Chest non-tender, No respiratory distress, Breath sounds nml


Abdomen: Soft, Other (Tender to palpation)





- Results


Results: 





                               Laboratory Results











WBC  5.3 x10^3/uL (4.8-10.8)   01/17/21  04:12    


 


RBC  4.16 10^6/uL (4.20-5.40)  L  01/17/21  04:12    


 


Hgb  12.5 g/dL (12.0-16.0)   01/17/21  04:12    


 


Hct  40.5 % (37.0-47.0)   01/17/21  04:12    


 


MCV  97.4 fL (81.0-99.0)   01/17/21  04:12    


 


MCH  30.0 pg (27.0-31.0)   01/17/21  04:12    


 


MCHC  30.9 g/dL (32.0-36.0)  L  01/17/21  04:12    


 


RDW  14.0 % (12.0-15.0)   01/17/21  04:12    


 


Plt Count  207 10^3/uL (130-450)   01/17/21  04:12    


 


MPV  10.5 fL (7.9-10.8)   01/17/21  04:12    


 


Neut # (Auto)  3.4 10^3/uL (1.5-6.6)   01/17/21  04:12    


 


Lymph # (Auto)  1.1 10^3/uL (1.5-3.5)  L  01/17/21  04:12    


 


Mono # (Auto)  0.6 10^3/uL (0.0-1.0)   01/17/21  04:12    


 


Eos # (Auto)  0.1 10^3/uL (0.0-0.7)   01/17/21  04:12    


 


Baso # (Auto)  0.0 10^3/uL (0.0-0.1)   01/17/21  04:12    


 


Absolute Nucleated RBC  0.00 x10^3/uL  01/17/21  04:12    


 


Nucleated RBC %  0.0 /100WBC  01/17/21  04:12    


 


PT  15.2 secs (9.9-12.6)  H  01/17/21  04:12    


 


INR  1.4  (0.8-1.2)  H  01/17/21  04:12    


 


Sodium  140 mmol/L (135-145)   01/17/21  04:12    


 


Potassium  4.0 mmol/L (3.5-5.0)   01/17/21  04:12    


 


Chloride  105 mmol/L (101-111)   01/17/21  04:12    


 


Carbon Dioxide  27 mmol/L (21-32)   01/17/21  04:12    


 


Anion Gap  8.0  (6-13)   01/17/21  04:12    


 


BUN  7 mg/dL (6-20)   01/17/21  04:12    


 


Creatinine  0.8 mg/dL (0.4-1.0)   01/17/21  04:12    


 


Estimated GFR (MDRD)  69  (>89)  L  01/17/21  04:12    


 


Glucose  104 mg/dL ()  H  01/17/21  04:12    


 


Calcium  8.7 mg/dL (8.5-10.3)   01/17/21  04:12    


 


Magnesium  2.0 mg/dL (1.7-2.8)   01/15/21  09:41    


 


Total Bilirubin  0.8 mg/dL (0.2-1.0)   01/15/21  09:41    


 


AST  21 IU/L (10-42)   01/15/21  09:41    


 


ALT  19 IU/L (10-60)   01/15/21  09:41    


 


Alkaline Phosphatase  160 IU/L ()  H  01/15/21  09:41    


 


Total Protein  7.1 g/dL (6.7-8.2)   01/15/21  09:41    


 


Albumin  3.9 g/dL (3.2-5.5)   01/15/21  09:41    


 


Globulin  3.2 g/dL (2.1-4.2)   01/15/21  09:41    


 


Albumin/Globulin Ratio  1.2  (1.0-2.2)   01/15/21  09:41    


 


Lipase  45 U/L (22-51)   01/15/21  09:41    


 


TSH  4.36 uIU/mL (0.34-5.60)   01/16/21  04:06    


 


Urine Color  YELLOW   01/15/21  09:35    


 


Urine Clarity  CLEAR  (CLEAR)   01/15/21  09:35    


 


Urine pH  6.5 PH (5.0-7.5)   01/15/21  09:35    


 


Ur Specific Gravity  1.020  (1.002-1.030)   01/15/21  09:35    


 


Urine Protein  NEGATIVE mg/dL (NEGATIVE)   01/15/21  09:35    


 


Urine Glucose (UA)  NEGATIVE mg/dL (NEGATIVE)   01/15/21  09:35    


 


Urine Ketones  NEGATIVE mg/dL (NEGATIVE)   01/15/21  09:35    


 


Urine Occult Blood  NEGATIVE  (NEGATIVE)   01/15/21  09:35    


 


Urine Nitrite  NEGATIVE  (NEGATIVE)   01/15/21  09:35    


 


Urine Bilirubin  NEGATIVE  (NEGATIVE)   01/15/21  09:35    


 


Urine Urobilinogen  0.2 (NORMAL) E.U./dL (NORMAL)   01/15/21  09:35    


 


Ur Leukocyte Esterase  NEGATIVE  (NEGATIVE)   01/15/21  09:35    


 


Ur Microscopic Review  NOT INDICATED   01/15/21  09:35    


 


Urine Culture Comments  NOT INDICATED   01/15/21  09:35    


 


Nasal Adenovirus (PCR)  NOT DETECTED   01/15/21  12:55    


 


Nasal B. parapertussis DNA (PCR)  NOT DETECTED   01/15/21  12:55    


 


Nasal Coronavir 229E PCR  NOT DETECTED   01/15/21  12:55    


 


Nasal Coronavir HKU1 PCR  NOT DETECTED   01/15/21  12:55    


 


Nasal Coronavir NL63 PCR  NOT DETECTED   01/15/21  12:55    


 


Nasal Coronavir OC43 PCR  NOT DETECTED   01/15/21  12:55    


 


Nasal Enterovir/Rhinovir PCR  NOT DETECTED   01/15/21  12:55    


 


Nasal Influenza B PCR  NOT DETECTED   01/15/21  12:55    


 


Nasal Influenza A PCR  NOT DETECTED   01/15/21  12:55    


 


Nasal Parainfluen 1 PCR  NOT DETECTED   01/15/21  12:55    


 


Nasal Parainfluen 2 PCR  NOT DETECTED   01/15/21  12:55    


 


Nasal Parainfluen 3 PCR  NOT DETECTED   01/15/21  12:55    


 


Nasal Parainfluen 4 PCR  NOT DETECTED   01/15/21  12:55    


 


Nasal RSV (PCR)  NOT DETECTED   01/15/21  12:55    


 


Nasal B.pertussis DNA PCR  NOT DETECTED   01/15/21  12:55    


 


Nasal C.pneumoniae (PCR)  NOT DETECTED   01/15/21  12:55    


 


Brody Human Metapneumo PCR  NOT DETECTED   01/15/21  12:55    


 


Nasal M.pneumoniae (PCR)  NOT DETECTED   01/15/21  12:55    


 


Nasal SARS-CoV-2 (PCR)  NOT DETECTED   01/15/21  12:55    


 


Blood Type  A POSITIVE   01/15/21  11:54    














- Procedures


Procedures: 





Procedures





ARTHO OF CARPOCARP OR CARPOMETACARP JT W/O IMPLANT (12/13/13)


COLONOSCOPY (09/11/14)


ESOPHAGOGASTRODUODENOSCOPY [EGD] W/CLOSED BIOPSY (09/11/14)


EXCISION OF STOMACH, ENDO, DIAGN (12/09/15)


EXCISION OF STOMACH, PYLORUS, ENDO, DIAGN (06/04/16)


INSPECTION OF UPPER INTESTINAL TRACT, ENDO (04/20/16)


OTHER ENDOSCOPY OF SM INTEST (12/15/14)


TRANSFUSE NONAUT FROZEN PLASMA IN PERIPH VEIN, PERC (06/04/16)


TRANSFUSE NONAUT RED BLOOD CELLS IN PERIPH VEIN, PERC (12/09/15)


TRANSPOSIT HAND TEND NEC (12/13/13)











ABX Reporting


Has patient been on IV antibiotics over the past 48 hours?: No

## 2021-01-17 NOTE — PROVIDER PROGRESS NOTE
Subjective





- Prog Note Date


Prog Note Date: 01/17/21





- Subjective


Pt reports feeling: Improved (no pain or nausea.  still has tenderness to deep 

palpation no flatus.)





Objective





- Vital Signs/Intake & Output


Vital Signs: 


                                Vital Signs x48h











  Temp Pulse Resp BP BP Pulse Ox


 


 01/17/21 08:00  37.1 C  75  14   142/86 H  94


 


 01/17/21 07:16   75    141/71 H 


 


 01/17/21 06:50   74    155/74 H 


 


 01/17/21 06:45   84    143/100 H 


 


 01/17/21 06:40   75    146/80 H 


 


 01/17/21 06:35     143/74 H  











Intake & Output: 


                                 Intake & Output











 01/14/21 01/15/21 01/16/21 01/17/21





 23:59 23:59 23:59 23:59


 


Intake Total  1793 2998.333 1000


 


Balance  1793 2998.333 1000














- Objective


General Appearance: positive: No acute distress, Alert


Eyes Bilateral: positive: Normal inspection, PERRL, EOMI


ENT: positive: No signs of dehydration


Neck: positive: No JVD


Respiratory: positive: No respiratory distress


Abdomen: positive: No distention, Other (minimal tenderness)





- Lab Results


Fish Bones: 


                                 01/17/21 04:12





                                 01/17/21 04:12


Other Labs: 


                               Lab Results x24hrs











  01/17/21 01/17/21 01/17/21 Range/Units





  04:12 04:12 04:12 


 


WBC   5.3   (4.8-10.8)  x10^3/uL


 


RBC   4.16 L   (4.20-5.40)  10^6/uL


 


Hgb   12.5   (12.0-16.0)  g/dL


 


Hct   40.5   (37.0-47.0)  %


 


MCV   97.4   (81.0-99.0)  fL


 


MCH   30.0   (27.0-31.0)  pg


 


MCHC   30.9 L   (32.0-36.0)  g/dL


 


RDW   14.0   (12.0-15.0)  %


 


Plt Count   207   (130-450)  10^3/uL


 


MPV   10.5   (7.9-10.8)  fL


 


Neut # (Auto)   3.4   (1.5-6.6)  10^3/uL


 


Lymph # (Auto)   1.1 L   (1.5-3.5)  10^3/uL


 


Mono # (Auto)   0.6   (0.0-1.0)  10^3/uL


 


Eos # (Auto)   0.1   (0.0-0.7)  10^3/uL


 


Baso # (Auto)   0.0   (0.0-0.1)  10^3/uL


 


Absolute Nucleated RBC   0.00   x10^3/uL


 


Nucleated RBC %   0.0   /100WBC


 


PT    15.2 H  (9.9-12.6)  secs


 


INR    1.4 H  (0.8-1.2)  


 


Sodium  140    (135-145)  mmol/L


 


Potassium  4.0    (3.5-5.0)  mmol/L


 


Chloride  105    (101-111)  mmol/L


 


Carbon Dioxide  27    (21-32)  mmol/L


 


Anion Gap  8.0    (6-13)  


 


BUN  7    (6-20)  mg/dL


 


Creatinine  0.8    (0.4-1.0)  mg/dL


 


Estimated GFR (MDRD)  69 L    (>89)  


 


Glucose  104 H    ()  mg/dL


 


Calcium  8.7    (8.5-10.3)  mg/dL














Assessment/Plan





- Problem List


(1) Small bowel obstruction


Impression: 


INR is almost back to normal.





diet clears as tolerated today.


npo after midnight.





plan exploratory laparotomy tomorrow with possible bowel resection

## 2021-01-18 LAB
ANION GAP SERPL CALCULATED.4IONS-SCNC: 7 MMOL/L (ref 6–13)
BASOPHILS NFR BLD AUTO: 0 10^3/UL (ref 0–0.1)
BASOPHILS NFR BLD AUTO: 0.8 %
BUN SERPL-MCNC: 8 MG/DL (ref 6–20)
CALCIUM UR-MCNC: 8.5 MG/DL (ref 8.5–10.3)
CHLORIDE SERPL-SCNC: 108 MMOL/L (ref 101–111)
CO2 SERPL-SCNC: 27 MMOL/L (ref 21–32)
CREAT SERPLBLD-SCNC: 0.7 MG/DL (ref 0.4–1)
EOSINOPHIL # BLD AUTO: 0.1 10^3/UL (ref 0–0.7)
EOSINOPHIL NFR BLD AUTO: 3 %
ERYTHROCYTE [DISTWIDTH] IN BLOOD BY AUTOMATED COUNT: 13.5 % (ref 12–15)
GLUCOSE SERPL-MCNC: 95 MG/DL (ref 70–100)
HGB UR QL STRIP: 12.2 G/DL (ref 12–16)
INR PPP: 1.5 (ref 0.8–1.2)
LYMPHOCYTES # SPEC AUTO: 1 10^3/UL (ref 1.5–3.5)
LYMPHOCYTES NFR BLD AUTO: 24.5 %
MCH RBC QN AUTO: 30.3 PG (ref 27–31)
MCHC RBC AUTO-ENTMCNC: 31.8 G/DL (ref 32–36)
MCV RBC AUTO: 95.5 FL (ref 81–99)
MONOCYTES # BLD AUTO: 0.6 10^3/UL (ref 0–1)
MONOCYTES NFR BLD AUTO: 15.4 %
NEUTROPHILS # BLD AUTO: 2.2 10^3/UL (ref 1.5–6.6)
NEUTROPHILS # SNV AUTO: 4 X10^3/UL (ref 4.8–10.8)
NEUTROPHILS NFR BLD AUTO: 56.3 %
PDW BLD AUTO: 10.1 FL (ref 7.9–10.8)
PLATELET # BLD: 181 10^3/UL (ref 130–450)
PROTHROM ACT/NOR PPP: 16.6 SECS (ref 9.9–12.6)
RBC MAR: 4.02 10^6/UL (ref 4.2–5.4)

## 2021-01-18 PROCEDURE — 0DB80ZZ EXCISION OF SMALL INTESTINE, OPEN APPROACH: ICD-10-PCS | Performed by: SURGERY

## 2021-01-18 RX ADMIN — HYDROMORPHONE HYDROCHLORIDE ONE MG: 1 INJECTION, SOLUTION INTRAMUSCULAR; INTRAVENOUS; SUBCUTANEOUS at 17:49

## 2021-01-18 RX ADMIN — SODIUM CHLORIDE, PRESERVATIVE FREE SCH: 5 INJECTION INTRAVENOUS at 08:51

## 2021-01-18 RX ADMIN — POTASSIUM CHLORIDE, DEXTROSE MONOHYDRATE AND SODIUM CHLORIDE SCH MLS/HR: 150; 5; 450 INJECTION, SOLUTION INTRAVENOUS at 14:04

## 2021-01-18 RX ADMIN — HYDROMORPHONE HYDROCHLORIDE ONE MG: 1 INJECTION, SOLUTION INTRAMUSCULAR; INTRAVENOUS; SUBCUTANEOUS at 17:39

## 2021-01-18 RX ADMIN — LIOTHYRONINE SODIUM SCH MCG: 5 TABLET ORAL at 08:50

## 2021-01-18 RX ADMIN — HYDROMORPHONE HYDROCHLORIDE ONE MG: 1 INJECTION, SOLUTION INTRAMUSCULAR; INTRAVENOUS; SUBCUTANEOUS at 17:23

## 2021-01-18 RX ADMIN — SODIUM CHLORIDE SCH MG: 9 INJECTION, SOLUTION INTRAVENOUS at 06:11

## 2021-01-18 RX ADMIN — HYDROCODONE BITARTRATE AND ACETAMINOPHEN PRN TAB: 7.5; 325 TABLET ORAL at 06:19

## 2021-01-18 RX ADMIN — POTASSIUM CHLORIDE, DEXTROSE MONOHYDRATE AND SODIUM CHLORIDE SCH MLS/HR: 150; 5; 450 INJECTION, SOLUTION INTRAVENOUS at 04:04

## 2021-01-18 RX ADMIN — METOPROLOL TARTRATE SCH MG: 5 INJECTION INTRAVENOUS at 06:10

## 2021-01-18 RX ADMIN — GABAPENTIN SCH: 400 CAPSULE ORAL at 20:48

## 2021-01-18 RX ADMIN — MORPHINE SULFATE PRN MG: 2 INJECTION, SOLUTION INTRAMUSCULAR; INTRAVENOUS at 14:03

## 2021-01-18 RX ADMIN — ONDANSETRON PRN MG: 2 INJECTION INTRAMUSCULAR; INTRAVENOUS at 17:26

## 2021-01-18 RX ADMIN — METOPROLOL TARTRATE SCH MG: 5 INJECTION INTRAVENOUS at 12:43

## 2021-01-18 RX ADMIN — Medication PRN MG: at 19:02

## 2021-01-18 RX ADMIN — MORPHINE SULFATE PRN MG: 2 INJECTION, SOLUTION INTRAMUSCULAR; INTRAVENOUS at 11:25

## 2021-01-18 RX ADMIN — METOPROLOL TARTRATE SCH MG: 5 INJECTION INTRAVENOUS at 20:33

## 2021-01-18 RX ADMIN — CYCLOBENZAPRINE SCH: 10 TABLET, FILM COATED ORAL at 20:48

## 2021-01-18 RX ADMIN — SODIUM CHLORIDE, PRESERVATIVE FREE SCH: 5 INJECTION INTRAVENOUS at 20:00

## 2021-01-18 NOTE — OPERATIVE REPORT
Operative Report





- General


Admit Date: 01/15/21


Planned Procedure: 





exploratory laparotomy possible small bowel resection


Pre-Op Diagnosis: chronic small bowel obstruction


Procedure Performed: 





exploratory laparotomy with sb resection


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





- Procedure Note


Primary Surgeon: dutch de anda


Secondary Surgeon: none


Anesthesia Technique: General ET tube, Local


Pathology: 





small bowel with messentery


Estimated Blood Loss (mL): 50


Drain/Tube Type: Other (none)


Findings: 





as above


Complications: 





none

## 2021-01-18 NOTE — PROVIDER PROGRESS NOTE
Assessment/Plan





- Problem List


(1) Small bowel obstruction


Assessment/Plan: 


Patient continues to be n.p.o. except for her medications.


IV hydration with D5 plus half normal saline +20 mEq of potassium chloride at 

100 mils per hour.


Pain management with Tylenol, Norco and morphine as needed.


Dr. Chepe Ortega with general surgery following.


Surgery today was successful and on eventful.








(2) Supratherapeutic INR


Assessment/Plan: 


Her INR was 4.7.


Her Coumadin was held and she received a dose of vitamin K IV.


Recheck of INR today was 1.5.








(3) Chronic atrial fibrillation


Assessment/Plan: 


On metoprolol 5 mg IV every 6 hours.











(4) Hypothyroidism


Assessment/Plan: 


On liothyronine 5 mcg p.o. daily.  Will continue.











(6) Hypertension


Assessment/Plan: 


Currently elevated.  Likely worsened by abdominal pain.


On metoprolol tartrate IV every 6 hours.


Patient receiving pain management with Tylenol, Norco and/or morphine.








(7) Leukopenia


Assessment/Plan: 


Reactive versus infectious.


Patient's white blood cell count is 5.0.


Patient is afebrile.  Will monitor.


If it worsens and/or patient becomes febrile will consider further work up to 

include


 blood cultures and empiric antibiotics.











- Current Meds


Current Meds: 





                               Current Medications











Generic Name Dose Route Start Last Admin





  Trade Name Freq  PRN Reason Stop Dose Admin


 


Acetaminophen  650 mg  01/15/21 11:42  01/17/21 20:04





  Acetaminophen 325 Mg Tablet  PO   650 mg





  Q4HR PRN   Administration





  Pain 1 to 4  


 


Hydrocodone Bitart/Acetaminophen  1 tab  01/16/21 11:46  01/18/21 06:19





  Hydrocod/Acetam 7.5 Mg/325 Mg Tablet  PO   1 tab





  TID PRN   Administration





  PAIN  


 


Cyclobenzaprine HCl  10 mg  01/16/21 21:00  01/17/21 20:04





  Cyclobenzaprine 10 Mg Tablet  PO   10 mg





  QPM PAVAN   Administration


 


Duloxetine HCl  60 mg  01/17/21 09:00  01/18/21 08:50





  Duloxetine 30 Mg Capsule  PO   60 mg





  DAILY PAVAN   Administration


 


Gabapentin  1,200 mg  01/16/21 21:00  01/17/21 20:04





  Gabapentin 400 Mg Capsule  PO   1,200 mg





  QPM PAVAN   Administration


 


Potassium Chloride/Dextrose/Sod Cl  1,000 mls @ 100 mls/hr  01/15/21 12:00  

01/18/21 04:04





  D5.45ns W/20 Meq Kcl  IV   100 mls/hr





  .Q10H PAVAN   Administration


 


Liothyronine Sodium  5 mcg  01/17/21 09:00  01/18/21 08:50





  Liothyronine 5 Mcg Tablet  PO   5 mcg





  DAILY PAVAN   Administration


 


Metoprolol Tartrate  5 mg  01/16/21 19:00  01/18/21 06:10





  Metoprolol 5 Mg/5 Ml Vial  IVP   5 mg





  Q6H PAVAN   Administration


 


Morphine Sulfate  2 mg  01/15/21 11:42  01/16/21 17:24





  Morphine 2 Mg/Ml Carpuject  IVP   2 mg





  Q2HR PRN   Administration





  Pain 8 to 10  


 


Ondansetron HCl  4 mg  01/15/21 11:42  01/16/21 17:29





  Ondansetron 4 Mg/2 Ml Vial  IVP   4 mg





  Q6HR PRN   Administration





  Nausea / Vomiting  


 


Pantoprazole Sodium  40 mg  01/16/21 07:00  01/18/21 06:11





  Pantoprazole 40 Mg Vial  IVP   40 mg





  QDAC PAVAN   Administration


 


Sodium Chloride  10 ml  01/15/21 11:42  01/16/21 06:31





  Sodium Chloride Flush 0.9% 10 Ml Syringe  IVP   10 ml





  PRN PRN   Administration





  AS NEEDED PER PROVIDER ORDERS  


 


Sodium Chloride  10 ml  01/15/21 17:00  01/18/21 08:51





  Sodium Chloride Flush 0.9% 10 Ml Syringe  IVP   Not Given





  0100,0900,1700 PAVAN  


 


Trazodone HCl  100 mg  01/16/21 21:00  01/17/21 20:03





  Trazodone 50 Mg Tablet  PO   100 mg





  QPM PAVAN   Administration














- Lab Result


Fish Bone Diagrams: 


                                 01/19/21 05:20





                                 01/19/21 11:31





Subjective





- Subjective


Patient Reports: Other (Patient was resting in bed at time of exam. Her 

abdominal pain persists. She was initially seen prior to surgery. She was place 

on a dilaudid pca pump after surgery. Surgery was uneventful. Carcinoid tumor is

suspected.)





Objective


Vital Signs: 





                               Vital Signs - 24 hr











  01/17/21 01/17/21 01/17/21





  12:55 13:03 15:59


 


Temperature 37.0 C  36.9 C


 


Heart Rate [ 85  91





Brachial]   


 


Respiratory 16  16





Rate   


 


Blood Pressure  143/70 H 


 


Blood Pressure 143/70 H  133/78 H





[Right Brachial   





artery]   


 


O2 Saturation 97  97














  01/17/21 01/17/21 01/17/21





  18:01 20:00 23:48


 


Temperature  36.9 C 


 


Heart Rate [  71 





Brachial]   


 


Respiratory  20 





Rate   


 


Blood Pressure 158/85 H  112/50 L


 


Blood Pressure  146/74 H 





[Right Brachial   





artery]   


 


O2 Saturation  98 














  01/17/21 01/18/21 01/18/21





  23:49 00:00 00:05


 


Temperature 36.5 C  


 


Heart Rate [ 121 H 71 76





Brachial]   


 


Respiratory 18  





Rate   


 


Blood Pressure   


 


Blood Pressure 112/50 L 107/64 107/64





[Right Brachial   





artery]   


 


O2 Saturation 93  














  01/18/21 01/18/21 01/18/21





  00:10 00:25 00:40


 


Temperature   


 


Heart Rate [ 61 73 65





Brachial]   


 


Respiratory   





Rate   


 


Blood Pressure   


 


Blood Pressure 115/54 L 109/47 L 111/61





[Right Brachial   





artery]   


 


O2 Saturation   














  01/18/21 01/18/21 01/18/21





  00:55 04:00 06:10


 


Temperature  37.0 C 


 


Heart Rate [ 69 83 





Brachial]   


 


Respiratory  15 





Rate   


 


Blood Pressure   131/80 H


 


Blood Pressure 101/46 L 129/62 





[Right Brachial   





artery]   


 


O2 Saturation  95 














  01/18/21 01/18/21 01/18/21





  06:15 06:21 06:45


 


Temperature   


 


Heart Rate [ 76 74 74





Brachial]   


 


Respiratory   





Rate   


 


Blood Pressure   


 


Blood Pressure 141/66 H 121/70 112/81 H





[Right Brachial   





artery]   


 


O2 Saturation   














  01/18/21





  08:52


 


Temperature 37.0 C


 


Heart Rate [ 77





Brachial] 


 


Respiratory 16





Rate 


 


Blood Pressure 


 


Blood Pressure 131/73 H





[Right Brachial 





artery] 


 


O2 Saturation 98








                                     Oxygen











O2 Source [With Activity]      Room air


 


O2 Source [Without Activity]   Room air


 


O2 Source                      Room air














I&O (Last 24 Hrs): 





                          Intake and Output Totals x24h











 01/16/21 01/17/21 01/18/21





 23:59 23:59 23:59


 


Intake Total 2998.333 2653.333 1000


 


Output Total   350


 


Balance 2998.333 2653.333 650











Comments/Notes: 





General: Alert, Oriented x3, Moderate distress


HEENT: PERRLA, EOMI


Neck: Supple, No JVD


Neuro: Alert, Non Focal, Oriented Times 3


Cardiovascular: Regular rate, Other (Irregularly irregular heart rhythm)


Respiratory: Chest non-tender, No respiratory distress, Breath sounds nml


Abdomen: Soft, Other (Tender to palpation)








- Results


Results: 





                               Laboratory Results











WBC  4.0 x10^3/uL (4.8-10.8)  L  01/18/21  05:21    


 


RBC  4.02 10^6/uL (4.20-5.40)  L  01/18/21  05:21    


 


Hgb  12.2 g/dL (12.0-16.0)   01/18/21  05:21    


 


Hct  38.4 % (37.0-47.0)   01/18/21  05:21    


 


MCV  95.5 fL (81.0-99.0)   01/18/21  05:21    


 


MCH  30.3 pg (27.0-31.0)   01/18/21  05:21    


 


MCHC  31.8 g/dL (32.0-36.0)  L  01/18/21  05:21    


 


RDW  13.5 % (12.0-15.0)   01/18/21  05:21    


 


Plt Count  181 10^3/uL (130-450)   01/18/21  05:21    


 


MPV  10.1 fL (7.9-10.8)   01/18/21  05:21    


 


Neut # (Auto)  2.2 10^3/uL (1.5-6.6)   01/18/21  05:21    


 


Lymph # (Auto)  1.0 10^3/uL (1.5-3.5)  L  01/18/21  05:21    


 


Mono # (Auto)  0.6 10^3/uL (0.0-1.0)   01/18/21  05:21    


 


Eos # (Auto)  0.1 10^3/uL (0.0-0.7)   01/18/21  05:21    


 


Baso # (Auto)  0.0 10^3/uL (0.0-0.1)   01/18/21  05:21    


 


Absolute Nucleated RBC  0.00 x10^3/uL  01/18/21  05:21    


 


Nucleated RBC %  0.0 /100WBC  01/18/21  05:21    


 


PT  16.6 secs (9.9-12.6)  H  01/18/21  05:21    


 


INR  1.5  (0.8-1.2)  H  01/18/21  05:21    


 


Sodium  142 mmol/L (135-145)   01/18/21  05:21    


 


Potassium  4.1 mmol/L (3.5-5.0)   01/18/21  05:21    


 


Chloride  108 mmol/L (101-111)   01/18/21  05:21    


 


Carbon Dioxide  27 mmol/L (21-32)   01/18/21  05:21    


 


Anion Gap  7.0  (6-13)   01/18/21  05:21    


 


BUN  8 mg/dL (6-20)   01/18/21  05:21    


 


Creatinine  0.7 mg/dL (0.4-1.0)   01/18/21  05:21    


 


Estimated GFR (MDRD)  81  (>89)  L  01/18/21  05:21    


 


Glucose  95 mg/dL ()   01/18/21  05:21    


 


Calcium  8.5 mg/dL (8.5-10.3)   01/18/21  05:21    


 


Magnesium  2.0 mg/dL (1.7-2.8)   01/15/21  09:41    


 


Total Bilirubin  0.8 mg/dL (0.2-1.0)   01/15/21  09:41    


 


AST  21 IU/L (10-42)   01/15/21  09:41    


 


ALT  19 IU/L (10-60)   01/15/21  09:41    


 


Alkaline Phosphatase  160 IU/L ()  H  01/15/21  09:41    


 


Total Protein  7.1 g/dL (6.7-8.2)   01/15/21  09:41    


 


Albumin  3.9 g/dL (3.2-5.5)   01/15/21  09:41    


 


Globulin  3.2 g/dL (2.1-4.2)   01/15/21  09:41    


 


Albumin/Globulin Ratio  1.2  (1.0-2.2)   01/15/21  09:41    


 


Lipase  45 U/L (22-51)   01/15/21  09:41    


 


TSH  4.36 uIU/mL (0.34-5.60)   01/16/21  04:06    


 


Urine Color  YELLOW   01/15/21  09:35    


 


Urine Clarity  CLEAR  (CLEAR)   01/15/21  09:35    


 


Urine pH  6.5 PH (5.0-7.5)   01/15/21  09:35    


 


Ur Specific Gravity  1.020  (1.002-1.030)   01/15/21  09:35    


 


Urine Protein  NEGATIVE mg/dL (NEGATIVE)   01/15/21  09:35    


 


Urine Glucose (UA)  NEGATIVE mg/dL (NEGATIVE)   01/15/21  09:35    


 


Urine Ketones  NEGATIVE mg/dL (NEGATIVE)   01/15/21  09:35    


 


Urine Occult Blood  NEGATIVE  (NEGATIVE)   01/15/21  09:35    


 


Urine Nitrite  NEGATIVE  (NEGATIVE)   01/15/21  09:35    


 


Urine Bilirubin  NEGATIVE  (NEGATIVE)   01/15/21  09:35    


 


Urine Urobilinogen  0.2 (NORMAL) E.U./dL (NORMAL)   01/15/21  09:35    


 


Ur Leukocyte Esterase  NEGATIVE  (NEGATIVE)   01/15/21  09:35    


 


Ur Microscopic Review  NOT INDICATED   01/15/21  09:35    


 


Urine Culture Comments  NOT INDICATED   01/15/21  09:35    


 


Nasal Adenovirus (PCR)  NOT DETECTED   01/15/21  12:55    


 


Nasal B. parapertussis DNA (PCR)  NOT DETECTED   01/15/21  12:55    


 


Nasal Coronavir 229E PCR  NOT DETECTED   01/15/21  12:55    


 


Nasal Coronavir HKU1 PCR  NOT DETECTED   01/15/21  12:55    


 


Nasal Coronavir NL63 PCR  NOT DETECTED   01/15/21  12:55    


 


Nasal Coronavir OC43 PCR  NOT DETECTED   01/15/21  12:55    


 


Nasal Enterovir/Rhinovir PCR  NOT DETECTED   01/15/21  12:55    


 


Nasal Influenza B PCR  NOT DETECTED   01/15/21  12:55    


 


Nasal Influenza A PCR  NOT DETECTED   01/15/21  12:55    


 


Nasal Parainfluen 1 PCR  NOT DETECTED   01/15/21  12:55    


 


Nasal Parainfluen 2 PCR  NOT DETECTED   01/15/21  12:55    


 


Nasal Parainfluen 3 PCR  NOT DETECTED   01/15/21  12:55    


 


Nasal Parainfluen 4 PCR  NOT DETECTED   01/15/21  12:55    


 


Nasal RSV (PCR)  NOT DETECTED   01/15/21  12:55    


 


Nasal B.pertussis DNA PCR  NOT DETECTED   01/15/21  12:55    


 


Nasal C.pneumoniae (PCR)  NOT DETECTED   01/15/21  12:55    


 


Brody Human Metapneumo PCR  NOT DETECTED   01/15/21  12:55    


 


Nasal M.pneumoniae (PCR)  NOT DETECTED   01/15/21  12:55    


 


Nasal SARS-CoV-2 (PCR)  NOT DETECTED   01/15/21  12:55    


 


Blood Type  A POSITIVE   01/15/21  11:54    














- Procedures


Procedures: 





Procedures





ARTHO OF CARPOCARP OR CARPOMETACARP JT W/O IMPLANT (12/13/13)


COLONOSCOPY (09/11/14)


ESOPHAGOGASTRODUODENOSCOPY [EGD] W/CLOSED BIOPSY (09/11/14)


EXCISION OF STOMACH, ENDO, DIAGN (12/09/15)


EXCISION OF STOMACH, PYLORUS, ENDO, DIAGN (06/04/16)


INSPECTION OF UPPER INTESTINAL TRACT, ENDO (04/20/16)


OTHER ENDOSCOPY OF SM INTEST (12/15/14)


TRANSFUSE NONAUT FROZEN PLASMA IN PERIPH VEIN, PERC (06/04/16)


TRANSFUSE NONAUT RED BLOOD CELLS IN PERIPH VEIN, PERC (12/09/15)


TRANSPOSIT HAND TEND NEC (12/13/13)











ABX Reporting


Has patient been on IV antibiotics over the past 48 hours?: No

## 2021-01-18 NOTE — ANESTHESIA POST OP EVALUATION
Anesthesia Post Eval





- Post Anesthesia Eval


Vitals: 





                                Last Vital Signs











Temp  36.5 C   01/18/21 20:29


 


Pulse  95   01/18/21 20:29


 


Resp  17   01/18/21 20:29


 


BP  158/90 H  01/18/21 20:33


 


Pulse Ox  99   01/18/21 20:29











CV Function Including HR & BP: positive: Stable


Pain Control: positive: Satisfactory


Nausea & Vomiting: positive: Negative


Mental Status: positive: Patient Participates


Respiratory Status: Airway Patent


Hydration Status: Satisfactory


Anesthesia Complications: positive: None

## 2021-01-18 NOTE — ANESTHESIA
Pre-Anesthesia VS, & Labs





- Diagnosis


small bowel obstruction








- Procedure





exploratory laparotomy


Vital Signs: 





                                        











Temp Pulse Resp BP Pulse Ox


 


 37.0 C   77   16   131/73 H  98 


 


 01/18/21 08:52  01/18/21 08:52  01/18/21 08:52  01/18/21 08:52  01/18/21 08:52











Height: 5 ft 3 in


Weight (kg): 63 kg


Body Mass Index: 24.5


BMI Classification: Healthy weight





- NPO


>8 hours





- Pregnancy


Is Patient Pregnant?: No





- Lab Results


Current Lab Results: 





Laboratory Tests





01/18/21 05:21: Sodium 142, Potassium 4.1, Chloride 108, Carbon Dioxide 27, 

Anion Gap 7.0, BUN 8, Creatinine 0.7, Estimated GFR (MDRD) 81 L, Glucose 95, 

Calcium 8.5


01/18/21 05:21: WBC 4.0 L, RBC 4.02 L, Hgb 12.2, Hct 38.4, MCV 95.5, MCH 30.3, 

MCHC 31.8 L, RDW 13.5, Plt Count 181, MPV 10.1, Neut # (Auto) 2.2, Lymph # 

(Auto) 1.0 L, Mono # (Auto) 0.6, Eos # (Auto) 0.1, Baso # (Auto) 0.0, Absolute 

Nucleated RBC 0.00, Nucleated RBC % 0.0


01/18/21 05:21: PT 16.6 H, INR 1.5 H


01/17/21 04:12: Sodium 140, Potassium 4.0, Chloride 105, Carbon Dioxide 27, 

Anion Gap 8.0, BUN 7, Creatinine 0.8, Estimated GFR (MDRD) 69 L, Glucose 104 H, 

Calcium 8.7


01/17/21 04:12: WBC 5.3, RBC 4.16 L, Hgb 12.5, Hct 40.5, MCV 97.4, MCH 30.0, 

MCHC 30.9 L, RDW 14.0, Plt Count 207, MPV 10.5, Neut # (Auto) 3.4, Lymph # 

(Auto) 1.1 L, Mono # (Auto) 0.6, Eos # (Auto) 0.1, Baso # (Auto) 0.0, Absolute 

Nucleated RBC 0.00, Nucleated RBC % 0.0


01/17/21 04:12: PT 15.2 H, INR 1.4 H


01/16/21 04:06: TSH 4.36


01/16/21 04:06: Sodium 140, Potassium 3.8, Chloride 106, Carbon Dioxide 29, 

Anion Gap 5.0 L, BUN 9, Creatinine 0.7, Estimated GFR (MDRD) 81 L, Glucose 99, 

Calcium 8.6


01/16/21 04:06: WBC 4.4 L, RBC 4.64, Hgb 13.9, Hct 44.9, MCV 96.8, MCH 30.0, 

MCHC 31.0 L, RDW 13.9, Plt Count 242, MPV 10.6, Neut # (Auto) 2.3, Lymph # 

(Auto) 1.5, Mono # (Auto) 0.5, Eos # (Auto) 0.1, Baso # (Auto) 0.0, Absolute 

Nucleated RBC 0.00, Nucleated RBC % 0.0


01/16/21 04:06: PT 20.1 H, INR 1.9 H


01/15/21 11:54: Blood Type A POSITIVE


01/15/21 09:41: PT 47.9 H, INR 4.7 H*


01/15/21 09:41: Sodium 140, Potassium 3.6, Chloride 103, Carbon Dioxide 27, 

Anion Gap 10.0, BUN 13, Creatinine 0.8, Estimated GFR (MDRD) 69 L, Glucose 102 H

, Calcium 8.7, Magnesium 2.0, Total Bilirubin 0.8, AST 21, ALT 19, Alkaline 

Phosphatase 160 H, Total Protein 7.1, Albumin 3.9, Globulin 3.2, 

Albumin/Globulin Ratio 1.2, Lipase 45


01/15/21 09:41: WBC 7.8, RBC 5.10, Hgb 15.7, Hct 48.6 H, MCV 95.3, MCH 30.8, 

MCHC 32.3, RDW 13.5, Plt Count 250, MPV 10.1, Neut # (Auto) 6.0, Lymph # (Auto) 

1.1 L, Mono # (Auto) 0.6, Eos # (Auto) 0.1, Baso # (Auto) 0.0, Absolute 

Nucleated RBC 0.00, Nucleated RBC % 0.0








Fish Bones: 


                                 01/18/21 05:21





                                 01/18/21 05:21





Home Medications and Allergies


Home Medications: 


Ambulatory Orders





Furosemide [Lasix] 20 mg PO DAILY 01/15/21 


Liothyronine [Cytomel] 5 mcg PO DAILY 01/15/21 


Potassium Chloride 10 meq PO DAILY 01/15/21 











Active Medications





Acetaminophen (Acetaminophen 325 Mg Tablet)  650 mg PO Q4HR PRN


   PRN Reason: Pain 1 to 4


   Last Admin: 01/17/21 20:04 Dose:  650 mg


   Documented by: 


Hydrocodone Bitart/Acetaminophen (Hydrocod/Acetam 7.5 Mg/325 Mg Tablet)  1 tab 

PO TID PRN


   PRN Reason: PAIN


   Last Admin: 01/18/21 06:19 Dose:  1 tab


   Documented by: 


Cyclobenzaprine HCl (Cyclobenzaprine 10 Mg Tablet)  10 mg PO QPM Pending sale to [facility]


   Last Admin: 01/17/21 20:04 Dose:  10 mg


   Documented by: 


Duloxetine HCl (Duloxetine 30 Mg Capsule)  60 mg PO DAILY Pending sale to [facility]


   Last Admin: 01/18/21 08:50 Dose:  60 mg


   Documented by: 


Gabapentin (Gabapentin 400 Mg Capsule)  1,200 mg PO QPM Pending sale to [facility]


   Last Admin: 01/17/21 20:04 Dose:  1,200 mg


   Documented by: 


Potassium Chloride/Dextrose/Sod Cl (D5.45ns W/20 Meq Kcl)  1,000 mls @ 100 

mls/hr IV .Q10H Pending sale to [facility]


   Last Admin: 01/18/21 04:04 Dose:  100 mls/hr


   Documented by: 


Cefotetan Disodium 1 gm/ (Sodium Chloride)  100 mls @ 200 mls/hr IV UD Pending sale to [facility]


Liothyronine Sodium (Liothyronine 5 Mcg Tablet)  5 mcg PO DAILY Pending sale to [facility]


   Last Admin: 01/18/21 08:50 Dose:  5 mcg


   Documented by: 


Metoprolol Tartrate (Metoprolol 5 Mg/5 Ml Vial)  5 mg IVP Q6H Pending sale to [facility]


   Last Admin: 01/18/21 06:10 Dose:  5 mg


   Documented by: 


Morphine Sulfate (Morphine 2 Mg/Ml Carpuject)  2 mg IVP Q2HR PRN


   PRN Reason: Pain 8 to 10


   Last Admin: 01/16/21 17:24 Dose:  2 mg


   Documented by: 


Ondansetron HCl (Ondansetron 4 Mg/2 Ml Vial)  4 mg IVP Q6HR PRN


   PRN Reason: Nausea / Vomiting


   Last Admin: 01/16/21 17:29 Dose:  4 mg


   Documented by: 


Pantoprazole Sodium (Pantoprazole 40 Mg Vial)  40 mg IVP QDAC Pending sale to [facility]


   Last Admin: 01/18/21 06:11 Dose:  40 mg


   Documented by: 


Prochlorperazine Edisylate (Prochlorperazine 10 Mg/2 Ml Vial)  10 mg IVP Q6HR 

PRN


   PRN Reason: Nausea / Vomiting


Sodium Chloride (Sodium Chloride Flush 0.9% 10 Ml Syringe)  10 ml IVP PRN PRN


   PRN Reason: AS NEEDED PER PROVIDER ORDERS


   Last Admin: 01/16/21 06:31 Dose:  10 ml


   Documented by: 


Sodium Chloride (Sodium Chloride Flush 0.9% 10 Ml Syringe)  10 ml IVP 

0100,0900,1700 Pending sale to [facility]


   Last Admin: 01/18/21 08:51 Dose:  Not Given


   Documented by: 


Trazodone HCl (Trazodone 50 Mg Tablet)  100 mg PO QPM Pending sale to [facility]


   Last Admin: 01/17/21 20:03 Dose:  100 mg


   Documented by: 





                                        





Estradiol 0.5 mg PO DAILY 10/21/13 


Trazodone HCl 100 mg PO HS PRN 10/21/13 


Cyclobenzaprine [Flexeril] 10 mg PO DAILY PM 12/09/15 


DULoxetine [Cymbalta] 60 mg PO DAILY 12/09/15 


Lovastatin 20 mg PO DAILY 12/09/15 


Metoprolol Succinate 100 mg PO QPM 12/09/15 


Warfarin [Coumadin] 2 mg PO QDWARFARIN 12/09/15 


Omeprazole [PriLOSEC] 20 mg PO BID 06/21/16 


Gabapentin 1,200 mg PO QPM 07/31/18 


Hydrocodone/Acetaminophen [Norco 7.5-325 Tablet] 1 tab PO TID PRN 12/21/20 


Furosemide [Lasix] 20 mg PO DAILY 01/15/21 


Liothyronine [Cytomel] 5 mcg PO DAILY 01/15/21 


Potassium Chloride 10 meq PO DAILY 01/15/21 








Allergies/Adverse Reactions: 


                                    Allergies











Allergy/AdvReac Type Severity Reaction Status Date / Time


 


iron dextran complex AdvReac  Respiratory Verified 01/15/21 09:04














Anes History & Medical History





- Anesthetic History


Anesthesia Complications: reports: No previous complications





- Medical History


Cardiovascular: reports: Atrial fibrillation


Pulmonary: reports: None


Gastrointestinal: reports: None, Hiatal hernia


Urinary: reports: None


Musculoskeletal: reports: Osteoarthritis, Fibromyalgia


Endocrine/Autoimmune: reports: HyPOthyroidism


Blood Disorders: reports: Anemia


Skin: reports: None


Smoking Status: Never smoker


History of Cancer?: No





- Surgical History


General: Colonoscopy, EGD


Eyes Ears Nose Throat (EENT): Cataracts, Tonsil/Adenoidectomy


Gynecologic: Hysterectomy


Orthopedic: Knee replacement, Spine surgery





Plan


Anesthesia Type: General, Transverse Abdominis Plane (TAP) Block


Consent for Procedure(s) Verified and Reviewed: Yes


Code Status: Attempt Resuscitation


ASA classification: 3-Severe systemic disease


Is this case an emergency?: No
77 y/o Female, with a PMHx of HTN, HLD, presents with an acute memory loss, admitted to telemetry for r/o CVA. CXR completed 2/16/17 revealed clear lungs.

## 2021-01-18 NOTE — PROVIDER PROGRESS NOTE
Subjective





- Prog Note Date


Prog Note Date: 01/18/21





- Subjective


Pt reports feeling: No change (still no appetite and not passing flutus.)





Objective





- Vital Signs/Intake & Output


Reviewed Vital Signs: Yes


Vital Signs: 


                                Vital Signs x48h











  Temp Pulse Resp BP BP Pulse Ox


 


 01/18/21 12:43     143/64 H  


 


 01/18/21 11:42  36.6 C  80  18   155/85 H  97


 


 01/18/21 08:52  37.0 C  77  16   131/73 H  98


 


 01/18/21 06:45   74    112/81 H 


 


 01/18/21 06:21   74    121/70 


 


 01/18/21 06:15   77    141/66 H 











Intake & Output: 


                                 Intake & Output











 01/15/21 01/16/21 01/17/21 01/18/21





 23:59 23:59 23:59 23:59


 


Intake Total 1793 2998.333 2653.333 2000


 


Output Total    350


 


Balance 1793 2998.333 2653.333 1650














- Objective


General Appearance: positive: No acute distress, Alert


ENT: positive: No signs of dehydration


Neck: positive: No JVD


Respiratory: positive: No respiratory distress


Cardiovascular: positive: Regular rate & rhythm


Abdomen: positive: No distention, Other (mild tenderness present)


Neurologic/Psychiatric: positive: Oriented x3





- Lab Results


Fish Bones: 


                                 01/18/21 05:21





                                 01/18/21 05:21


Other Labs: 


                               Lab Results x24hrs











  01/18/21 01/18/21 01/18/21 Range/Units





  05:21 05:21 05:21 


 


WBC   4.0 L   (4.8-10.8)  x10^3/uL


 


RBC   4.02 L   (4.20-5.40)  10^6/uL


 


Hgb   12.2   (12.0-16.0)  g/dL


 


Hct   38.4   (37.0-47.0)  %


 


MCV   95.5   (81.0-99.0)  fL


 


MCH   30.3   (27.0-31.0)  pg


 


MCHC   31.8 L   (32.0-36.0)  g/dL


 


RDW   13.5   (12.0-15.0)  %


 


Plt Count   181   (130-450)  10^3/uL


 


MPV   10.1   (7.9-10.8)  fL


 


Neut # (Auto)   2.2   (1.5-6.6)  10^3/uL


 


Lymph # (Auto)   1.0 L   (1.5-3.5)  10^3/uL


 


Mono # (Auto)   0.6   (0.0-1.0)  10^3/uL


 


Eos # (Auto)   0.1   (0.0-0.7)  10^3/uL


 


Baso # (Auto)   0.0   (0.0-0.1)  10^3/uL


 


Absolute Nucleated RBC   0.00   x10^3/uL


 


Nucleated RBC %   0.0   /100WBC


 


PT    16.6 H  (9.9-12.6)  secs


 


INR    1.5 H  (0.8-1.2)  


 


Sodium  142    (135-145)  mmol/L


 


Potassium  4.1    (3.5-5.0)  mmol/L


 


Chloride  108    (101-111)  mmol/L


 


Carbon Dioxide  27    (21-32)  mmol/L


 


Anion Gap  7.0    (6-13)  


 


BUN  8    (6-20)  mg/dL


 


Creatinine  0.7    (0.4-1.0)  mg/dL


 


Estimated GFR (MDRD)  81 L    (>89)  


 


Glucose  95    ()  mg/dL


 


Calcium  8.5    (8.5-10.3)  mg/dL














Assessment/Plan





- Problem List


(1) Small bowel obstruction


Impression: 


continued small bowel obstructive symptoms





plan exploratory laparotomy, possible bowel resection.





parq held and consent obtained

## 2021-01-18 NOTE — OPERATIVE REPORT
DATE OF SERVICE: 01/18/2021

Physician: Chepe Ortega MD

 

PREOPERATIVE DIAGNOSIS:  Progressive small-bowel obstruction with a possible mass.

 

POSTOPERATIVE DIAGNOSIS:  Progressive small-bowel obstruction with mass concerning for a carcinoid tu
mor or neuroendocrine tumor.

 

PROCEDURE PERFORMED:  Exploratory laparotomy and small bowel resection.

 

SURGEON:  Chepe Ortega MD

 

ASSISTANT:  None.

 

ANESTHESIA:  

1.  General endotracheal anesthesia.

2.  Local anesthesia with Marcaine.

 

COMPLICATIONS:  None.

 

SPECIMEN:  Small bowel.

 

ESTIMATED BLOOD LOSS:  50 mL or less.

 

DRAINS:  None.

 

INDICATIONS FOR PROCEDURE:  The patient is a 80-year-old relatively healthy with progressive chronic 
small bowel type of obstructive symptoms over the last few months.  She presents for exploratory lapa
rotomy and possible bowel resection.  Risks discussed, alternatives discussed.  All questions answere
d and consent obtained.

 

DESCRIPTION OF PROCEDURE:  The patient was properly identified and brought to the operating room and 
placed in supine position.  She voided prior to surgery.  General endotracheal anesthesia was induced
.  Sequential compression devices were placed.  She was prepped and draped in a sterile fashion, give
n preoperative antibiotics.  Local anesthetic was given to the incision area.  A 5-6 cm midline incis
ion was made caudad of the umbilicus.  Dissection proceeded with cutting currents.  The abdomen was o
pened sharply.  The area of chronic partial obstruction was brought up.  She had a notable mass as we
ll.  The small bowel was divided with JACQUELIN stapler.  As I proceeded with the dissection of the mesente
ariel ishan mass, she developed further and further ischemia.  The ischemic bowel was then further divi
ded with a JACQUELIN stapler on both ends.  With further dissection, taking more mesentery again additional
 ischemia occurred.  Eventually approximately 12 cm of small bowel was taken.  The entire ishan disea
se was taken.  There was no other palpable adenopathy along the aorta or retroperitoneum.  A handsewn
 end-to-end anastomosis was performed.  A standard 2-layer technique with outer 3-0 silk Lemberts and
 an inner running 3-0 Vicryl.  Good anastomosis was obtained with both ends were clearly viable.  Mes
enteric defect was then closed with figure-of-eight 3-0 silk.  There was no spillage.  Minimal to no 
blood loss.  The omentum was brought down beneath the midline incision.  An additional local anesthet
ic was given.  Fascia was closed with two running 0 PDS suture.  Subcutaneous tissue was irrigated an
d then closed with interrupted 2-0 Vicryl.  Buried interrupted subdermal 3-0 Vicryl sutures were then
 placed.  Skin was closed with a running 4-0 Monocryl subcuticular suture.  Steri-Strips and dressing
 were applied.  She tolerated the procedure very well.

 

 

DD: 01/18/2021 19:21

TD: 01/18/2021 19:24

Job #: [id number]

## 2021-01-19 LAB
ANION GAP SERPL CALCULATED.4IONS-SCNC: 6 MMOL/L (ref 6–13)
ANION GAP SERPL CALCULATED.4IONS-SCNC: 8 MMOL/L (ref 6–13)
BASOPHILS NFR BLD AUTO: 0 10^3/UL (ref 0–0.1)
BASOPHILS NFR BLD AUTO: 0.3 %
BUN SERPL-MCNC: 6 MG/DL (ref 6–20)
BUN SERPL-MCNC: 7 MG/DL (ref 6–20)
CALCIUM UR-MCNC: 8.3 MG/DL (ref 8.5–10.3)
CALCIUM UR-MCNC: 8.5 MG/DL (ref 8.5–10.3)
CHLORIDE SERPL-SCNC: 102 MMOL/L (ref 101–111)
CHLORIDE SERPL-SCNC: 102 MMOL/L (ref 101–111)
CO2 SERPL-SCNC: 24 MMOL/L (ref 21–32)
CO2 SERPL-SCNC: 26 MMOL/L (ref 21–32)
CREAT SERPLBLD-SCNC: 0.5 MG/DL (ref 0.4–1)
CREAT SERPLBLD-SCNC: 0.7 MG/DL (ref 0.4–1)
EOSINOPHIL # BLD AUTO: 0 10^3/UL (ref 0–0.7)
EOSINOPHIL NFR BLD AUTO: 0 %
ERYTHROCYTE [DISTWIDTH] IN BLOOD BY AUTOMATED COUNT: 13.3 % (ref 12–15)
GLUCOSE SERPL-MCNC: 108 MG/DL (ref 70–100)
GLUCOSE SERPL-MCNC: 126 MG/DL (ref 70–100)
HGB UR QL STRIP: 12.1 G/DL (ref 12–16)
INR PPP: 1.5 (ref 0.8–1.2)
LYMPHOCYTES # SPEC AUTO: 0.8 10^3/UL (ref 1.5–3.5)
LYMPHOCYTES NFR BLD AUTO: 12.2 %
MCH RBC QN AUTO: 30 PG (ref 27–31)
MCHC RBC AUTO-ENTMCNC: 32 G/DL (ref 32–36)
MCV RBC AUTO: 93.6 FL (ref 81–99)
MONOCYTES # BLD AUTO: 0.6 10^3/UL (ref 0–1)
MONOCYTES NFR BLD AUTO: 8.9 %
NEUTROPHILS # BLD AUTO: 5.1 10^3/UL (ref 1.5–6.6)
NEUTROPHILS # SNV AUTO: 6.6 X10^3/UL (ref 4.8–10.8)
NEUTROPHILS NFR BLD AUTO: 78.4 %
PDW BLD AUTO: 10.9 FL (ref 7.9–10.8)
PLATELET # BLD: 223 10^3/UL (ref 130–450)
PROTHROM ACT/NOR PPP: 15.9 SECS (ref 9.9–12.6)
RBC MAR: 4.04 10^6/UL (ref 4.2–5.4)

## 2021-01-19 RX ADMIN — SODIUM CHLORIDE, PRESERVATIVE FREE PRN ML: 5 INJECTION INTRAVENOUS at 01:51

## 2021-01-19 RX ADMIN — SODIUM CHLORIDE, PRESERVATIVE FREE SCH ML: 5 INJECTION INTRAVENOUS at 09:05

## 2021-01-19 RX ADMIN — SODIUM CHLORIDE, PRESERVATIVE FREE PRN ML: 5 INJECTION INTRAVENOUS at 19:10

## 2021-01-19 RX ADMIN — METOPROLOL TARTRATE SCH MG: 5 INJECTION INTRAVENOUS at 01:41

## 2021-01-19 RX ADMIN — SODIUM CHLORIDE, PRESERVATIVE FREE PRN ML: 5 INJECTION INTRAVENOUS at 06:41

## 2021-01-19 RX ADMIN — MORPHINE SULFATE PRN MG: 2 INJECTION, SOLUTION INTRAMUSCULAR; INTRAVENOUS at 14:04

## 2021-01-19 RX ADMIN — SODIUM CHLORIDE SCH MG: 9 INJECTION, SOLUTION INTRAVENOUS at 06:38

## 2021-01-19 RX ADMIN — ONDANSETRON PRN MG: 2 INJECTION INTRAMUSCULAR; INTRAVENOUS at 03:19

## 2021-01-19 RX ADMIN — Medication PRN MG: at 15:20

## 2021-01-19 RX ADMIN — MORPHINE SULFATE PRN MG: 2 INJECTION, SOLUTION INTRAMUSCULAR; INTRAVENOUS at 19:09

## 2021-01-19 RX ADMIN — LIOTHYRONINE SODIUM SCH MCG: 5 TABLET ORAL at 09:04

## 2021-01-19 RX ADMIN — HYDROCODONE BITARTRATE AND ACETAMINOPHEN PRN TAB: 7.5; 325 TABLET ORAL at 03:19

## 2021-01-19 RX ADMIN — ACETAMINOPHEN PRN MG: 325 TABLET ORAL at 00:30

## 2021-01-19 RX ADMIN — SODIUM CHLORIDE, PRESERVATIVE FREE PRN ML: 5 INJECTION INTRAVENOUS at 06:38

## 2021-01-19 RX ADMIN — SODIUM CHLORIDE, PRESERVATIVE FREE PRN ML: 5 INJECTION INTRAVENOUS at 06:32

## 2021-01-19 RX ADMIN — SODIUM CHLORIDE, PRESERVATIVE FREE SCH: 5 INJECTION INTRAVENOUS at 16:56

## 2021-01-19 RX ADMIN — POTASSIUM CHLORIDE, DEXTROSE MONOHYDRATE AND SODIUM CHLORIDE SCH MLS/HR: 150; 5; 450 INJECTION, SOLUTION INTRAVENOUS at 06:32

## 2021-01-19 RX ADMIN — MORPHINE SULFATE PRN MG: 2 INJECTION, SOLUTION INTRAMUSCULAR; INTRAVENOUS at 09:08

## 2021-01-19 RX ADMIN — HYDROCODONE BITARTRATE AND ACETAMINOPHEN PRN TAB: 7.5; 325 TABLET ORAL at 15:52

## 2021-01-19 RX ADMIN — CYCLOBENZAPRINE SCH MG: 10 TABLET, FILM COATED ORAL at 00:32

## 2021-01-19 RX ADMIN — METOPROLOL TARTRATE SCH MG: 5 INJECTION INTRAVENOUS at 06:39

## 2021-01-19 RX ADMIN — GABAPENTIN SCH MG: 400 CAPSULE ORAL at 20:59

## 2021-01-19 RX ADMIN — SODIUM CHLORIDE, PRESERVATIVE FREE SCH ML: 5 INJECTION INTRAVENOUS at 01:30

## 2021-01-19 RX ADMIN — METOPROLOL TARTRATE SCH MG: 5 INJECTION INTRAVENOUS at 12:25

## 2021-01-19 RX ADMIN — METOPROLOL TARTRATE SCH MG: 5 INJECTION INTRAVENOUS at 19:57

## 2021-01-19 RX ADMIN — CYCLOBENZAPRINE SCH MG: 10 TABLET, FILM COATED ORAL at 21:02

## 2021-01-19 RX ADMIN — POTASSIUM CHLORIDE, DEXTROSE MONOHYDRATE AND SODIUM CHLORIDE SCH MLS/HR: 150; 5; 450 INJECTION, SOLUTION INTRAVENOUS at 16:53

## 2021-01-19 NOTE — PROVIDER PROGRESS NOTE
Subjective





- Prog Note Date


Prog Note Date: 01/19/21





- Subjective


Pt reports feeling: No change (minimal pain.  no nausea. no flatus)





Objective





- Vital Signs/Intake & Output


Vital Signs: 


                                Vital Signs x48h











  Temp Pulse Resp BP BP Pulse Ox


 


 01/19/21 12:35  37.0 C  70  20   173/77 H  96


 


 01/19/21 12:25     149/80 H  


 


 01/19/21 08:50  36.7 C  81  18   158/72 H  97


 


 01/19/21 06:48    17   


 


 01/19/21 06:39     144/78 H  











Intake & Output: 


                                 Intake & Output











 01/16/21 01/17/21 01/18/21 01/19/21





 23:59 23:59 23:59 23:59


 


Intake Total 2998.333 2653.333 2085 878.333


 


Output Total   1675 1450


 


Balance 2998.333 2653.333 410 -571.667














- Objective


General Appearance: positive: No acute distress


Eyes Bilateral: positive: Normal inspection, PERRL, EOMI


ENT: positive: No signs of dehydration


Neck: positive: No JVD, Trachea midline


Respiratory: positive: No respiratory distress


Rectal: positive: Non-tender, Other (non distended)


Neurologic/Psychiatric: positive: Oriented x3





- Lab Results


Fish Bones: 


                                 01/19/21 05:20





                                 01/19/21 11:31


Other Labs: 


                               Lab Results x24hrs











  01/19/21 01/19/21 01/19/21 Range/Units





  11:31 05:20 05:20 


 


WBC    6.6  (4.8-10.8)  x10^3/uL


 


RBC    4.04 L  (4.20-5.40)  10^6/uL


 


Hgb    12.1  (12.0-16.0)  g/dL


 


Hct    37.8  (37.0-47.0)  %


 


MCV    93.6  (81.0-99.0)  fL


 


MCH    30.0  (27.0-31.0)  pg


 


MCHC    32.0  (32.0-36.0)  g/dL


 


RDW    13.3  (12.0-15.0)  %


 


Plt Count    223  (130-450)  10^3/uL


 


MPV    10.9 H  (7.9-10.8)  fL


 


Neut # (Auto)    5.1  (1.5-6.6)  10^3/uL


 


Lymph # (Auto)    0.8 L  (1.5-3.5)  10^3/uL


 


Mono # (Auto)    0.6  (0.0-1.0)  10^3/uL


 


Eos # (Auto)    0.0  (0.0-0.7)  10^3/uL


 


Baso # (Auto)    0.0  (0.0-0.1)  10^3/uL


 


Absolute Nucleated RBC    0.00  x10^3/uL


 


Nucleated RBC %    0.0  /100WBC


 


PT     (9.9-12.6)  secs


 


INR     (0.8-1.2)  


 


Sodium  136  132 L   (135-145)  mmol/L


 


Potassium  3.8  4.0   (3.5-5.0)  mmol/L


 


Chloride  102  102   (101-111)  mmol/L


 


Carbon Dioxide  26  24   (21-32)  mmol/L


 


Anion Gap  8.0  6.0   (6-13)  


 


BUN  6  7   (6-20)  mg/dL


 


Creatinine  0.7  0.5   (0.4-1.0)  mg/dL


 


Estimated GFR (MDRD)  81 L  119   (>89)  


 


Glucose  108 H  126 H   ()  mg/dL


 


Calcium  8.5  8.3 L   (8.5-10.3)  mg/dL














  01/19/21 Range/Units





  05:20 


 


WBC   (4.8-10.8)  x10^3/uL


 


RBC   (4.20-5.40)  10^6/uL


 


Hgb   (12.0-16.0)  g/dL


 


Hct   (37.0-47.0)  %


 


MCV   (81.0-99.0)  fL


 


MCH   (27.0-31.0)  pg


 


MCHC   (32.0-36.0)  g/dL


 


RDW   (12.0-15.0)  %


 


Plt Count   (130-450)  10^3/uL


 


MPV   (7.9-10.8)  fL


 


Neut # (Auto)   (1.5-6.6)  10^3/uL


 


Lymph # (Auto)   (1.5-3.5)  10^3/uL


 


Mono # (Auto)   (0.0-1.0)  10^3/uL


 


Eos # (Auto)   (0.0-0.7)  10^3/uL


 


Baso # (Auto)   (0.0-0.1)  10^3/uL


 


Absolute Nucleated RBC   x10^3/uL


 


Nucleated RBC %   /100WBC


 


PT  15.9 H  (9.9-12.6)  secs


 


INR  1.5 H  (0.8-1.2)  


 


Sodium   (135-145)  mmol/L


 


Potassium   (3.5-5.0)  mmol/L


 


Chloride   (101-111)  mmol/L


 


Carbon Dioxide   (21-32)  mmol/L


 


Anion Gap   (6-13)  


 


BUN   (6-20)  mg/dL


 


Creatinine   (0.4-1.0)  mg/dL


 


Estimated GFR (MDRD)   (>89)  


 


Glucose   ()  mg/dL


 


Calcium   (8.5-10.3)  mg/dL














Assessment/Plan





- Problem List


(1) Small bowel obstruction


Impression: 


Doing well post op day one small bowel resection.





diet clears as tolerated


laguerre placed yesterday for urinary retention





plan laguerre out tomorrow

## 2021-01-19 NOTE — PROVIDER PROGRESS NOTE
Subjective





- Prog Note Date


Prog Note Date: 01/19/21





- Subjective


Subjective: 


She continues to complain of abdominal pain that is 7 out of 10.  She states it 

improves except time after pain medication.  Still has nausea but no vomiting.  

She is not passing gas.  She thinks that she is still going for surgery later 

today.





Current Medications





- Current Medications


Current Medications: 





Active Medications





Acetaminophen (Acetaminophen 325 Mg Tablet)  650 mg PO Q4HR PRN


   PRN Reason: Pain 1 to 4


   Last Admin: 01/19/21 00:30 Dose:  650 mg


   Documented by: 


Hydrocodone Bitart/Acetaminophen (Hydrocod/Acetam 7.5 Mg/325 Mg Tablet)  1 tab 

PO TID PRN


   PRN Reason: PAIN


   Last Admin: 01/19/21 03:19 Dose:  1 tab


   Documented by: 


Cyclobenzaprine HCl (Cyclobenzaprine 10 Mg Tablet)  10 mg PO QPM [facility]


   Last Admin: 01/19/21 00:32 Dose:  10 mg


   Documented by: 


Duloxetine HCl (Duloxetine 30 Mg Capsule)  60 mg PO DAILY [facility]


   Last Admin: 01/19/21 09:04 Dose:  60 mg


   Documented by: 


Gabapentin (Gabapentin 400 Mg Capsule)  1,200 mg PO QPM [facility]


   Last Admin: 01/18/21 20:48 Dose:  Not Given


   Documented by: 


Potassium Chloride/Dextrose/Sod Cl (D5.45ns W/20 Meq Kcl)  1,000 mls @ 100 

mls/hr IV .Q10H [facility]


   Last Admin: 01/19/21 06:32 Dose:  100 mls/hr


   Documented by: 


Liothyronine Sodium (Liothyronine 5 Mcg Tablet)  5 mcg PO DAILY [facility]


   Last Admin: 01/19/21 09:04 Dose:  5 mcg


   Documented by: 


Metoprolol Tartrate (Metoprolol 5 Mg/5 Ml Vial)  5 mg IVP Q6H [facility]


   Last Admin: 01/19/21 12:25 Dose:  5 mg


   Documented by: 


Morphine Sulfate (Morphine 2 Mg/Ml Carpuject)  2 mg IVP Q2HR PRN


   PRN Reason: Pain 8 to 10


   Last Admin: 01/19/21 14:04 Dose:  2 mg


   Documented by: 


Ondansetron HCl (Ondansetron 4 Mg/2 Ml Vial)  4 mg IVP Q6HR PRN


   PRN Reason: Nausea / Vomiting


   Last Admin: 01/19/21 03:19 Dose:  4 mg


   Documented by: 


Pantoprazole Sodium (Pantoprazole 40 Mg Vial)  40 mg IVP QDAC [facility]


   Last Admin: 01/19/21 06:38 Dose:  40 mg


   Documented by: 


Prochlorperazine Edisylate (Prochlorperazine 10 Mg/2 Ml Vial)  10 mg IVP Q6HR 

PRN


   PRN Reason: Nausea / Vomiting


Sodium Chloride (Sodium Chloride Flush 0.9% 10 Ml Syringe)  10 ml IVP PRN PRN


   PRN Reason: AS NEEDED PER PROVIDER ORDERS


   Last Admin: 01/19/21 06:41 Dose:  10 ml


   Documented by: 


Sodium Chloride (Sodium Chloride Flush 0.9% 10 Ml Syringe)  10 ml IVP 0100,0

900,1700 [facility]


   Last Admin: 01/19/21 09:05 Dose:  10 ml


   Documented by: 


Trazodone HCl (Trazodone 50 Mg Tablet)  100 mg PO QPM [facility]


   Last Admin: 01/18/21 22:43 Dose:  Not Given


   Documented by: 





                                        





Estradiol 0.5 mg PO DAILY 10/21/13 


Trazodone HCl 100 mg PO HS PRN 10/21/13 


Cyclobenzaprine [Flexeril] 10 mg PO DAILY PM 12/09/15 


DULoxetine [Cymbalta] 60 mg PO DAILY 12/09/15 


Lovastatin 20 mg PO DAILY 12/09/15 


Metoprolol Succinate 100 mg PO QPM 12/09/15 


Warfarin [Coumadin] 2 mg PO QDWARFARIN 12/09/15 


Omeprazole [PriLOSEC] 20 mg PO BID 06/21/16 


Gabapentin 1,200 mg PO QPM 07/31/18 


Hydrocodone/Acetaminophen [Norco 7.5-325 Tablet] 1 tab PO TID PRN 12/21/20 


Furosemide [Lasix] 20 mg PO DAILY 01/15/21 


Liothyronine [Cytomel] 5 mcg PO DAILY 01/15/21 


Potassium Chloride 10 meq PO DAILY 01/15/21 











Objective





- Vital Signs/Intake & Output


Reviewed Vital Signs: Yes


Vital Signs: 


                                Vital Signs x48h











  Temp Pulse Resp BP BP Pulse Ox


 


 01/19/21 06:48    17   


 


 01/19/21 06:39     144/78 H  


 


 01/19/21 05:45  36.8 C  83  16   148/89 H  96


 


 01/19/21 03:00    18   


 


 01/19/21 01:55    17   


 


 01/19/21 01:41     160/86 H  


 


 01/19/21 01:39   92  17   160/86 H  96


 


 01/19/21 01:02    17   


 


 01/19/21 00:28    18   











Intake & Output: 


                                 Intake & Output











 01/16/21 01/17/21 01/18/21 01/19/21





 23:59 23:59 23:59 23:59


 


Intake Total 2998.333 2653.333 2085 878.333


 


Output Total   1675 1450


 


Balance 2998.333 2653.333 410 -571.667














- Objective


General Appearance: positive: No acute distress, Alert


Eyes Bilateral: positive: Normal inspection, Conjunctivae nml


ENT: positive: ENT inspection nml


Neck: positive: Nml inspection


Respiratory: positive: No respiratory distress.  negative: Wheezes, Rales


Cardiovascular: positive: No murmur, Irregularly irregular.  negative: 

Tachycardia


Abdomen: positive: Tenderness (Right sided abdominal tenderness.), Abnml bowel 

sounds (Hypoactive.).  negative: Non-tender, Guarding, Rebound


Skin: positive: Warm, Dry


Extremities: positive: Full ROM, No pedal edema


Neurologic/Psychiatric: negative: Disoriented to person, Disoriented to place





- Lab Results


Fish Bones: 


                                 01/19/21 05:20





                                 01/19/21 11:31


Other Labs: 


                               Lab Results x24hrs











  01/19/21 01/19/21 01/19/21 Range/Units





  05:20 05:20 05:20 


 


WBC   6.6   (4.8-10.8)  x10^3/uL


 


RBC   4.04 L   (4.20-5.40)  10^6/uL


 


Hgb   12.1   (12.0-16.0)  g/dL


 


Hct   37.8   (37.0-47.0)  %


 


MCV   93.6   (81.0-99.0)  fL


 


MCH   30.0   (27.0-31.0)  pg


 


MCHC   32.0   (32.0-36.0)  g/dL


 


RDW   13.3   (12.0-15.0)  %


 


Plt Count   223   (130-450)  10^3/uL


 


MPV   10.9 H   (7.9-10.8)  fL


 


Neut # (Auto)   5.1   (1.5-6.6)  10^3/uL


 


Lymph # (Auto)   0.8 L   (1.5-3.5)  10^3/uL


 


Mono # (Auto)   0.6   (0.0-1.0)  10^3/uL


 


Eos # (Auto)   0.0   (0.0-0.7)  10^3/uL


 


Baso # (Auto)   0.0   (0.0-0.1)  10^3/uL


 


Absolute Nucleated RBC   0.00   x10^3/uL


 


Nucleated RBC %   0.0   /100WBC


 


PT    15.9 H  (9.9-12.6)  secs


 


INR    1.5 H  (0.8-1.2)  


 


Sodium  132 L    (135-145)  mmol/L


 


Potassium  4.0    (3.5-5.0)  mmol/L


 


Chloride  102    (101-111)  mmol/L


 


Carbon Dioxide  24    (21-32)  mmol/L


 


Anion Gap  6.0    (6-13)  


 


BUN  7    (6-20)  mg/dL


 


Creatinine  0.5    (0.4-1.0)  mg/dL


 


Estimated GFR (MDRD)  119    (>89)  


 


Glucose  126 H    ()  mg/dL


 


Calcium  8.3 L    (8.5-10.3)  mg/dL














ABX Reporting


Has patient been on IV antibiotics over the past 48 hours?: No





Assessment/Plan





- Problem List


(1) Small bowel obstruction


Impression: 


She is status post small bowel resection for ischemic bowel.  She is still 

n.p.o. at this time.  Her pain is still persistent and she has no flatus.  We 

will continue with n.p.o. status.  Will consider clear liquid diet in the 

morning if okay with general surgery.  We will switch her pain control to 

morphine IV as needed rather than Dilaudid PCA pump due to her delirium.  Up and

 out of bed as tolerated.








(2) Delirium


Impression: 


She does appear to be delirious.  She oriented to self and location but still 

believe she is having surgery tomorrow.  This is likely secondary to the use of 

opiates and the effects of anesthesia.  We will discontinue her PCA pump.  

Continue with morphine IV as needed.  Delirium precautions.








(3) Chronic atrial fibrillation


Impression: 


She remains rate controlled.  We will resume her Coumadin once okay with general

 surgery.  We will continue with IV Lopressor and resume her home oral 

metoprolol once she is taking p.o. consistently.








(4) Hypertension


Impression: 


Is hypertensive today with systolic in the 140s.  She is on metoprolol at home 

and is receiving IV Lopressor scheduled.  I suspect her blood pressure is poorly

 controlled due to her abdominal pain.  We will continue to monitor and add a 

second hypertensive as needed.








(5) Hypothyroidism


Impression: 


Stable.  Continue home medication.

## 2021-01-20 LAB
ANION GAP SERPL CALCULATED.4IONS-SCNC: 7 MMOL/L (ref 6–13)
BASOPHILS NFR BLD AUTO: 0.1 10^3/UL (ref 0–0.1)
BASOPHILS NFR BLD AUTO: 0.5 %
BUN SERPL-MCNC: 8 MG/DL (ref 6–20)
CALCIUM UR-MCNC: 8.4 MG/DL (ref 8.5–10.3)
CHLORIDE SERPL-SCNC: 101 MMOL/L (ref 101–111)
CO2 SERPL-SCNC: 28 MMOL/L (ref 21–32)
CREAT SERPLBLD-SCNC: 0.7 MG/DL (ref 0.4–1)
EOSINOPHIL # BLD AUTO: 0.3 10^3/UL (ref 0–0.7)
EOSINOPHIL NFR BLD AUTO: 2.6 %
ERYTHROCYTE [DISTWIDTH] IN BLOOD BY AUTOMATED COUNT: 13.5 % (ref 12–15)
GLUCOSE SERPL-MCNC: 107 MG/DL (ref 70–100)
HGB UR QL STRIP: 12.1 G/DL (ref 12–16)
INR PPP: 1.6 (ref 0.8–1.2)
LYMPHOCYTES # SPEC AUTO: 0.9 10^3/UL (ref 1.5–3.5)
LYMPHOCYTES NFR BLD AUTO: 9.1 %
MCH RBC QN AUTO: 30.6 PG (ref 27–31)
MCHC RBC AUTO-ENTMCNC: 31.9 G/DL (ref 32–36)
MCV RBC AUTO: 95.7 FL (ref 81–99)
MONOCYTES # BLD AUTO: 0.6 10^3/UL (ref 0–1)
MONOCYTES NFR BLD AUTO: 5.9 %
NEUTROPHILS # BLD AUTO: 7.7 10^3/UL (ref 1.5–6.6)
NEUTROPHILS # SNV AUTO: 9.5 X10^3/UL (ref 4.8–10.8)
NEUTROPHILS NFR BLD AUTO: 81.7 %
PDW BLD AUTO: 11 FL (ref 7.9–10.8)
PLATELET # BLD: 253 10^3/UL (ref 130–450)
PROTHROM ACT/NOR PPP: 17.6 SECS (ref 9.9–12.6)
RBC MAR: 3.96 10^6/UL (ref 4.2–5.4)

## 2021-01-20 RX ADMIN — SODIUM CHLORIDE, PRESERVATIVE FREE SCH: 5 INJECTION INTRAVENOUS at 08:31

## 2021-01-20 RX ADMIN — CYCLOBENZAPRINE SCH MG: 10 TABLET, FILM COATED ORAL at 20:43

## 2021-01-20 RX ADMIN — GABAPENTIN SCH MG: 400 CAPSULE ORAL at 20:42

## 2021-01-20 RX ADMIN — SODIUM CHLORIDE, PRESERVATIVE FREE SCH: 5 INJECTION INTRAVENOUS at 16:59

## 2021-01-20 RX ADMIN — WARFARIN SODIUM SCH: 1 TABLET ORAL at 13:49

## 2021-01-20 RX ADMIN — METOPROLOL TARTRATE SCH MG: 5 INJECTION INTRAVENOUS at 00:46

## 2021-01-20 RX ADMIN — MORPHINE SULFATE PRN MG: 2 INJECTION, SOLUTION INTRAMUSCULAR; INTRAVENOUS at 11:18

## 2021-01-20 RX ADMIN — LIOTHYRONINE SODIUM SCH MCG: 5 TABLET ORAL at 08:31

## 2021-01-20 RX ADMIN — ATORVASTATIN CALCIUM SCH MG: 10 TABLET, FILM COATED ORAL at 20:43

## 2021-01-20 RX ADMIN — METOPROLOL SUCCINATE SCH MG: 50 TABLET, EXTENDED RELEASE ORAL at 20:42

## 2021-01-20 RX ADMIN — METOPROLOL TARTRATE SCH MG: 5 INJECTION INTRAVENOUS at 06:22

## 2021-01-20 RX ADMIN — SODIUM CHLORIDE, PRESERVATIVE FREE SCH ML: 5 INJECTION INTRAVENOUS at 00:47

## 2021-01-20 RX ADMIN — SODIUM CHLORIDE SCH MG: 9 INJECTION, SOLUTION INTRAVENOUS at 06:21

## 2021-01-20 RX ADMIN — POTASSIUM CHLORIDE, DEXTROSE MONOHYDRATE AND SODIUM CHLORIDE SCH MLS/HR: 150; 5; 450 INJECTION, SOLUTION INTRAVENOUS at 02:56

## 2021-01-20 RX ADMIN — HYDROCODONE BITARTRATE AND ACETAMINOPHEN PRN TAB: 7.5; 325 TABLET ORAL at 19:28

## 2021-01-20 RX ADMIN — ONDANSETRON PRN MG: 2 INJECTION INTRAMUSCULAR; INTRAVENOUS at 09:12

## 2021-01-20 NOTE — PROVIDER PROGRESS NOTE
Subjective





- Prog Note Date


Prog Note Date: 01/20/21





- Subjective


Pt reports feeling: No change (no bowel activity or appetite.  some discomfort 

with po.  no vomiting)





Objective





- Vital Signs/Intake & Output


Reviewed Vital Signs: Yes


Vital Signs: 


                                Vital Signs x48h











  Temp Pulse Resp BP BP Pulse Ox


 


 01/20/21 08:28  37.2 C  91  16   136/65 H  96


 


 01/20/21 06:22     136/72 H  











Intake & Output: 


                                 Intake & Output











 01/17/21 01/18/21 01/19/21 01/20/21





 23:59 23:59 23:59 23:59


 


Intake Total 2653.333 2085 3051.666 1207.327


 


Output Total  1675 3575 1875


 


Balance 2653.333 410 -523.334 -667.673














- Objective


General Appearance: positive: No acute distress


Eyes Bilateral: positive: Normal inspection, PERRL, EOMI


ENT: positive: No signs of dehydration


Neck: positive: No JVD


Respiratory: positive: No respiratory distress


Abdomen: positive: Non-tender, No distention, Other (dressing c/d/i  no 

erythema)





- Lab Results


Fish Bones: 


                                 01/20/21 04:01





                                 01/20/21 04:01


Other Labs: 


                               Lab Results x24hrs











  01/20/21 01/20/21 01/20/21 Range/Units





  04:01 04:01 04:01 


 


WBC   9.5   (4.8-10.8)  x10^3/uL


 


RBC   3.96 L   (4.20-5.40)  10^6/uL


 


Hgb   12.1   (12.0-16.0)  g/dL


 


Hct   37.9   (37.0-47.0)  %


 


MCV   95.7   (81.0-99.0)  fL


 


MCH   30.6   (27.0-31.0)  pg


 


MCHC   31.9 L   (32.0-36.0)  g/dL


 


RDW   13.5   (12.0-15.0)  %


 


Plt Count   253   (130-450)  10^3/uL


 


MPV   11.0 H   (7.9-10.8)  fL


 


Neut # (Auto)   7.7 H   (1.5-6.6)  10^3/uL


 


Lymph # (Auto)   0.9 L   (1.5-3.5)  10^3/uL


 


Mono # (Auto)   0.6   (0.0-1.0)  10^3/uL


 


Eos # (Auto)   0.3   (0.0-0.7)  10^3/uL


 


Baso # (Auto)   0.1   (0.0-0.1)  10^3/uL


 


Absolute Nucleated RBC   0.00   x10^3/uL


 


Nucleated RBC %   0.0   /100WBC


 


PT    17.6 H  (9.9-12.6)  secs


 


INR    1.6 H  (0.8-1.2)  


 


Sodium  136    (135-145)  mmol/L


 


Potassium  4.1    (3.5-5.0)  mmol/L


 


Chloride  101    (101-111)  mmol/L


 


Carbon Dioxide  28    (21-32)  mmol/L


 


Anion Gap  7.0    (6-13)  


 


BUN  8    (6-20)  mg/dL


 


Creatinine  0.7    (0.4-1.0)  mg/dL


 


Estimated GFR (MDRD)  81 L    (>89)  


 


Glucose  107 H    ()  mg/dL


 


Calcium  8.4 L    (8.5-10.3)  mg/dL














Assessment/Plan





- Problem List


(1) Small bowel obstruction


Impression: 


ileus post small bowel resection.





d/c laguerre.





decrease ivf





start warfarin

## 2021-01-20 NOTE — PROVIDER PROGRESS NOTE
Subjective





- Prog Note Date


Prog Note Date: 01/20/21





- Subjective


Subjective: 


He reports feeling nauseous but denies vomiting.  Still has abdominal pain.  

Denies passing gas and has not had a bowel movement.  She has been tolerating a 

clear liquid diet.





Current Medications





- Current Medications


Current Medications: 





Active Medications





Acetaminophen (Acetaminophen 325 Mg Tablet)  650 mg PO Q4HR PRN


   PRN Reason: Pain 1 to 4


   Last Admin: 01/19/21 00:30 Dose:  650 mg


   Documented by: 


Hydrocodone Bitart/Acetaminophen (Hydrocod/Acetam 7.5 Mg/325 Mg Tablet)  1 tab 

PO TID PRN


   PRN Reason: PAIN


   Last Admin: 01/19/21 15:52 Dose:  1 tab


   Documented by: 


Cyclobenzaprine HCl (Cyclobenzaprine 10 Mg Tablet)  10 mg PO QPM [facility]


   Last Admin: 01/19/21 21:02 Dose:  10 mg


   Documented by: 


Duloxetine HCl (Duloxetine 30 Mg Capsule)  60 mg PO DAILY [facility]


   Last Admin: 01/20/21 08:31 Dose:  60 mg


   Documented by: 


Gabapentin (Gabapentin 400 Mg Capsule)  1,200 mg PO QPM [facility]


   Last Admin: 01/19/21 20:59 Dose:  1,200 mg


   Documented by: 


Potassium Chloride/Dextrose/Sod Cl (D5.45ns W/20 Meq Kcl)  1,000 mls @ 50 mls/hr

IV .Q20H [facility]


   Last Admin: 01/20/21 10:21 Dose:  50 mls/hr


   Documented by: 


Liothyronine Sodium (Liothyronine 5 Mcg Tablet)  5 mcg PO DAILY [facility]


   Last Admin: 01/20/21 08:31 Dose:  5 mcg


   Documented by: 


Metoprolol Tartrate (Metoprolol 5 Mg/5 Ml Vial)  5 mg IVP Q6H [facility]


   Last Admin: 01/20/21 06:22 Dose:  5 mg


   Documented by: 


Morphine Sulfate (Morphine 2 Mg/Ml Carpuject)  2 mg IVP Q2HR PRN


   PRN Reason: Pain 8 to 10


   Last Admin: 01/20/21 11:18 Dose:  2 mg


   Documented by: 


Ondansetron HCl (Ondansetron 4 Mg/2 Ml Vial)  4 mg IVP Q6HR PRN


   PRN Reason: Nausea / Vomiting


   Last Admin: 01/20/21 09:12 Dose:  4 mg


   Documented by: 


Pantoprazole Sodium (Pantoprazole 40 Mg Vial)  40 mg IVP QDAC [facility]


   Last Admin: 01/20/21 06:21 Dose:  40 mg


   Documented by: 


Prochlorperazine Edisylate (Prochlorperazine 10 Mg/2 Ml Vial)  10 mg IVP Q6HR 

PRN


   PRN Reason: Nausea / Vomiting


   Last Admin: 01/20/21 10:55 Dose:  10 mg


   Documented by: 


Sodium Chloride (Sodium Chloride Flush 0.9% 10 Ml Syringe)  10 ml IVP PRN PRN


   PRN Reason: AS NEEDED PER PROVIDER ORDERS


   Last Admin: 01/19/21 19:10 Dose:  10 ml


   Documented by: 


Sodium Chloride (Sodium Chloride Flush 0.9% 10 Ml Syringe)  10 ml IVP 

0100,0900,1700 [facility]


   Last Admin: 01/20/21 08:31 Dose:  Not Given


   Documented by: 


Trazodone HCl (Trazodone 50 Mg Tablet)  100 mg PO QPM [facility]


   Last Admin: 01/19/21 21:00 Dose:  100 mg


   Documented by: 


Warfarin Sodium (Warfarin 1 Mg Tablet)  2 mg PO QDWARFARIN [facility]





                                        





Estradiol 0.5 mg PO DAILY 10/21/13 


Trazodone HCl 100 mg PO HS PRN 10/21/13 


Cyclobenzaprine [Flexeril] 10 mg PO DAILY PM 12/09/15 


DULoxetine [Cymbalta] 60 mg PO DAILY 12/09/15 


Lovastatin 20 mg PO DAILY 12/09/15 


Metoprolol Succinate 100 mg PO QPM 12/09/15 


Warfarin [Coumadin] 2 mg PO QDWARFARIN 12/09/15 


Omeprazole [PriLOSEC] 20 mg PO BID 06/21/16 


Gabapentin 1,200 mg PO QPM 07/31/18 


Hydrocodone/Acetaminophen [Norco 7.5-325 Tablet] 1 tab PO TID PRN 12/21/20 


Furosemide [Lasix] 20 mg PO DAILY 01/15/21 


Liothyronine [Cytomel] 5 mcg PO DAILY 01/15/21 


Potassium Chloride 10 meq PO DAILY 01/15/21 











Objective





- Vital Signs/Intake & Output


Reviewed Vital Signs: Yes


Vital Signs: 


                                Vital Signs x48h











  Temp Pulse Resp BP BP Pulse Ox


 


 01/20/21 06:22     136/72 H  


 


 01/20/21 03:46  37.9 C  85  16   136/72 H  96


 


 01/20/21 00:46     132/80 H  


 


 01/19/21 23:40  36.7 C  95  16   132/68 H  96











Intake & Output: 


                                 Intake & Output











 01/17/21 01/18/21 01/19/21 01/20/21





 23:59 23:59 23:59 23:59


 


Intake Total 2653.333 2085 3051.666 466.667


 


Output Total  1675 3575 1275


 


Balance 2653.333 410 -523.334 -808.333














- Objective


General Appearance: positive: No acute distress, Alert


Eyes Bilateral: positive: Normal inspection, Conjunctivae nml


ENT: positive: ENT inspection nml


Neck: positive: Nml inspection


Respiratory: positive: No respiratory distress.  negative: Wheezes, Rales


Cardiovascular: positive: Regular rate & rhythm, No murmur.  negative: Tachyca

rdia


Abdomen: positive: Tenderness (Mild tenderness over the right side of the 

abdomen.), Abnml bowel sounds (Hypoactive bowel sounds), Other (Sling is in 

place.).  negative: Guarding, Rebound


Skin: positive: Warm, Dry


Extremities: positive: Full ROM, No pedal edema


Neurologic/Psychiatric: negative: Disoriented to person, Disoriented to place, 

Disoriented to time





- Lab Results


Fish Bones: 


                                 01/20/21 04:01





                                 01/20/21 04:01


Other Labs: 


                               Lab Results x24hrs











  01/20/21 01/20/21 01/20/21 Range/Units





  04:01 04:01 04:01 


 


WBC   9.5   (4.8-10.8)  x10^3/uL


 


RBC   3.96 L   (4.20-5.40)  10^6/uL


 


Hgb   12.1   (12.0-16.0)  g/dL


 


Hct   37.9   (37.0-47.0)  %


 


MCV   95.7   (81.0-99.0)  fL


 


MCH   30.6   (27.0-31.0)  pg


 


MCHC   31.9 L   (32.0-36.0)  g/dL


 


RDW   13.5   (12.0-15.0)  %


 


Plt Count   253   (130-450)  10^3/uL


 


MPV   11.0 H   (7.9-10.8)  fL


 


Neut # (Auto)   7.7 H   (1.5-6.6)  10^3/uL


 


Lymph # (Auto)   0.9 L   (1.5-3.5)  10^3/uL


 


Mono # (Auto)   0.6   (0.0-1.0)  10^3/uL


 


Eos # (Auto)   0.3   (0.0-0.7)  10^3/uL


 


Baso # (Auto)   0.1   (0.0-0.1)  10^3/uL


 


Absolute Nucleated RBC   0.00   x10^3/uL


 


Nucleated RBC %   0.0   /100WBC


 


PT    17.6 H  (9.9-12.6)  secs


 


INR    1.6 H  (0.8-1.2)  


 


Sodium  136    (135-145)  mmol/L


 


Potassium  4.1    (3.5-5.0)  mmol/L


 


Chloride  101    (101-111)  mmol/L


 


Carbon Dioxide  28    (21-32)  mmol/L


 


Anion Gap  7.0    (6-13)  


 


BUN  8    (6-20)  mg/dL


 


Creatinine  0.7    (0.4-1.0)  mg/dL


 


Estimated GFR (MDRD)  81 L    (>89)  


 


Glucose  107 H    ()  mg/dL


 


Calcium  8.4 L    (8.5-10.3)  mg/dL














  01/19/21 Range/Units





  11:31 


 


WBC   (4.8-10.8)  x10^3/uL


 


RBC   (4.20-5.40)  10^6/uL


 


Hgb   (12.0-16.0)  g/dL


 


Hct   (37.0-47.0)  %


 


MCV   (81.0-99.0)  fL


 


MCH   (27.0-31.0)  pg


 


MCHC   (32.0-36.0)  g/dL


 


RDW   (12.0-15.0)  %


 


Plt Count   (130-450)  10^3/uL


 


MPV   (7.9-10.8)  fL


 


Neut # (Auto)   (1.5-6.6)  10^3/uL


 


Lymph # (Auto)   (1.5-3.5)  10^3/uL


 


Mono # (Auto)   (0.0-1.0)  10^3/uL


 


Eos # (Auto)   (0.0-0.7)  10^3/uL


 


Baso # (Auto)   (0.0-0.1)  10^3/uL


 


Absolute Nucleated RBC   x10^3/uL


 


Nucleated RBC %   /100WBC


 


PT   (9.9-12.6)  secs


 


INR   (0.8-1.2)  


 


Sodium  136  (135-145)  mmol/L


 


Potassium  3.8  (3.5-5.0)  mmol/L


 


Chloride  102  (101-111)  mmol/L


 


Carbon Dioxide  26  (21-32)  mmol/L


 


Anion Gap  8.0  (6-13)  


 


BUN  6  (6-20)  mg/dL


 


Creatinine  0.7  (0.4-1.0)  mg/dL


 


Estimated GFR (MDRD)  81 L  (>89)  


 


Glucose  108 H  ()  mg/dL


 


Calcium  8.5  (8.5-10.3)  mg/dL














Assessment/Plan





- Problem List


(1) Small bowel obstruction


Impression: 


She is postop day 2 of an expiratory laparotomy with small bowel resection.  

There was also concern for possible carcinoid tumor although pathology is 

pending.  She is tolerating clear liquid diet but still has some nausea.  No 

emesis.  He still has not had return of bowel function.  We will continue with 

clear liquid diet as tolerated.  Zofran and Compazine as needed for nausea.  

Continue pain control with morphine as needed.  We will follow up pathology.








(2) Delirium


Impression: 


She was a little delirious yesterday but today she is alert oriented x3.  She 

appears back to her baseline at this time.  We will continue to avoid excessive 

sedatives with opiates.  Delirium precautions.








(3) Chronic atrial fibrillation


Impression: 


She remains rate controlled.  Given she is tolerating p.o., will restart her 

home oral metoprolol.  Coumadin has been resumed as well.  We will check a daily

 INR.








(4) Hypertension


Impression: 


Her blood pressure stable in the 130s.  We have resumed her home oral metoprolol

 today.  We will continue to monitor closely.








(5) Hypothyroidism


Impression: 


Stable.  Continue home medication.

## 2021-01-21 LAB
ANION GAP SERPL CALCULATED.4IONS-SCNC: 6 MMOL/L (ref 6–13)
BASOPHILS NFR BLD AUTO: 0 10^3/UL (ref 0–0.1)
BASOPHILS NFR BLD AUTO: 0.3 %
BUN SERPL-MCNC: 14 MG/DL (ref 6–20)
CALCIUM UR-MCNC: 8.5 MG/DL (ref 8.5–10.3)
CHLORIDE SERPL-SCNC: 107 MMOL/L (ref 101–111)
CO2 SERPL-SCNC: 27 MMOL/L (ref 21–32)
CREAT SERPLBLD-SCNC: 0.9 MG/DL (ref 0.4–1)
EOSINOPHIL # BLD AUTO: 0.5 10^3/UL (ref 0–0.7)
EOSINOPHIL NFR BLD AUTO: 8.7 %
ERYTHROCYTE [DISTWIDTH] IN BLOOD BY AUTOMATED COUNT: 13.5 % (ref 12–15)
GLUCOSE SERPL-MCNC: 94 MG/DL (ref 70–100)
HGB UR QL STRIP: 13.1 G/DL (ref 12–16)
INR PPP: 1.4 (ref 0.8–1.2)
LYMPHOCYTES # SPEC AUTO: 1.1 10^3/UL (ref 1.5–3.5)
LYMPHOCYTES NFR BLD AUTO: 19.5 %
MCH RBC QN AUTO: 30 PG (ref 27–31)
MCHC RBC AUTO-ENTMCNC: 31.3 G/DL (ref 32–36)
MCV RBC AUTO: 96.1 FL (ref 81–99)
MONOCYTES # BLD AUTO: 0.5 10^3/UL (ref 0–1)
MONOCYTES NFR BLD AUTO: 9.2 %
NEUTROPHILS # BLD AUTO: 3.5 10^3/UL (ref 1.5–6.6)
NEUTROPHILS # SNV AUTO: 5.7 X10^3/UL (ref 4.8–10.8)
NEUTROPHILS NFR BLD AUTO: 62 %
PDW BLD AUTO: 10.5 FL (ref 7.9–10.8)
PLATELET # BLD: 268 10^3/UL (ref 130–450)
PROTHROM ACT/NOR PPP: 15.5 SECS (ref 9.9–12.6)
RBC MAR: 4.36 10^6/UL (ref 4.2–5.4)

## 2021-01-21 RX ADMIN — HYDROCODONE BITARTRATE AND ACETAMINOPHEN PRN TAB: 7.5; 325 TABLET ORAL at 17:13

## 2021-01-21 RX ADMIN — HYDROCODONE BITARTRATE AND ACETAMINOPHEN PRN TAB: 7.5; 325 TABLET ORAL at 12:13

## 2021-01-21 RX ADMIN — SODIUM CHLORIDE, PRESERVATIVE FREE SCH: 5 INJECTION INTRAVENOUS at 16:29

## 2021-01-21 RX ADMIN — SODIUM CHLORIDE, PRESERVATIVE FREE SCH: 5 INJECTION INTRAVENOUS at 09:34

## 2021-01-21 RX ADMIN — METOPROLOL SUCCINATE SCH MG: 50 TABLET, EXTENDED RELEASE ORAL at 20:39

## 2021-01-21 RX ADMIN — WARFARIN SODIUM SCH MG: 1 TABLET ORAL at 13:26

## 2021-01-21 RX ADMIN — ATORVASTATIN CALCIUM SCH MG: 10 TABLET, FILM COATED ORAL at 20:40

## 2021-01-21 RX ADMIN — SODIUM CHLORIDE, PRESERVATIVE FREE SCH ML: 5 INJECTION INTRAVENOUS at 01:00

## 2021-01-21 RX ADMIN — CYCLOBENZAPRINE SCH MG: 10 TABLET, FILM COATED ORAL at 20:39

## 2021-01-21 RX ADMIN — SODIUM CHLORIDE SCH MG: 9 INJECTION, SOLUTION INTRAVENOUS at 06:30

## 2021-01-21 RX ADMIN — GABAPENTIN SCH MG: 400 CAPSULE ORAL at 20:39

## 2021-01-21 RX ADMIN — LIOTHYRONINE SODIUM SCH MCG: 5 TABLET ORAL at 09:32

## 2021-01-21 RX ADMIN — HYDROCODONE BITARTRATE AND ACETAMINOPHEN PRN TAB: 7.5; 325 TABLET ORAL at 09:33

## 2021-01-21 RX ADMIN — PANTOPRAZOLE SODIUM SCH MG: 40 TABLET, DELAYED RELEASE ORAL at 12:09

## 2021-01-21 NOTE — PROVIDER PROGRESS NOTE
Subjective





- Prog Note Date


Prog Note Date: 01/21/21





- Subjective


Subjective: 


She report having a bowel movement yesterday evening.  Denies nausea or 

vomiting.  Still has abdominal pain.  She feels better overall.





Current Medications





- Current Medications


Current Medications: 





Active Medications





Acetaminophen (Acetaminophen 325 Mg Tablet)  650 mg PO Q4HR PRN


   PRN Reason: Pain 1 to 4


   Last Admin: 01/19/21 00:30 Dose:  650 mg


   Documented by: 


Hydrocodone Bitart/Acetaminophen (Hydrocod/Acetam 7.5 Mg/325 Mg Tablet)  1 tab 

PO TID PRN


   PRN Reason: PAIN


   Last Admin: 01/21/21 09:33 Dose:  1 tab


   Documented by: 


Atorvastatin Calcium (Atorvastatin 10 Mg Tablet)  10 mg PO QPM [facility]


   Last Admin: 01/20/21 20:43 Dose:  10 mg


   Documented by: 


Cyclobenzaprine HCl (Cyclobenzaprine 10 Mg Tablet)  10 mg PO QPM [facility]


   Last Admin: 01/20/21 20:43 Dose:  10 mg


   Documented by: 


Duloxetine HCl (Duloxetine 30 Mg Capsule)  60 mg PO DAILY [facility]


   Last Admin: 01/21/21 09:33 Dose:  Not Given


   Documented by: 


Gabapentin (Gabapentin 400 Mg Capsule)  1,200 mg PO QPM [facility]


   Last Admin: 01/20/21 20:42 Dose:  1,200 mg


   Documented by: 


Liothyronine Sodium (Liothyronine 5 Mcg Tablet)  5 mcg PO DAILY [facility]


   Last Admin: 01/21/21 09:32 Dose:  5 mcg


   Documented by: 


Metoprolol Succinate (Metoprolol Succinate 50 Mg Tablet)  100 mg PO QPM [facility]


   Last Admin: 01/20/21 20:42 Dose:  100 mg


   Documented by: 


Morphine Sulfate (Morphine 2 Mg/Ml Carpuject)  2 mg IVP Q2HR PRN


   PRN Reason: Pain 8 to 10


   Last Admin: 01/20/21 11:18 Dose:  2 mg


   Documented by: 


Ondansetron HCl (Ondansetron 4 Mg/2 Ml Vial)  4 mg IVP Q6HR PRN


   PRN Reason: Nausea / Vomiting


   Last Admin: 01/20/21 09:12 Dose:  4 mg


   Documented by: 


Pantoprazole Sodium (Pantoprazole 40 Mg Tablet)  40 mg PO QDAC [facility]


Prochlorperazine Edisylate (Prochlorperazine 10 Mg/2 Ml Vial)  10 mg IVP Q6HR 

PRN


   PRN Reason: Nausea / Vomiting


   Last Admin: 01/20/21 10:55 Dose:  10 mg


   Documented by: 


Sodium Chloride (Sodium Chloride Flush 0.9% 10 Ml Syringe)  10 ml IVP PRN PRN


   PRN Reason: AS NEEDED PER PROVIDER ORDERS


   Last Admin: 01/19/21 19:10 Dose:  10 ml


   Documented by: 


Sodium Chloride (Sodium Chloride Flush 0.9% 10 Ml Syringe)  10 ml IVP 

0100,0900,1700 [facility]


   Last Admin: 01/21/21 09:34 Dose:  Not Given


   Documented by: 


Trazodone HCl (Trazodone 50 Mg Tablet)  100 mg PO QPM [facility]


   Last Admin: 01/20/21 20:43 Dose:  100 mg


   Documented by: 


Warfarin Sodium (Warfarin 1 Mg Tablet)  2 mg PO QDWARFARIN [facility]


   Last Admin: 01/20/21 13:49 Dose:  Not Given


   Documented by: 





                                        





Estradiol 0.5 mg PO DAILY 10/21/13 


Trazodone HCl 100 mg PO HS PRN 10/21/13 


Cyclobenzaprine [Flexeril] 10 mg PO DAILY PM 12/09/15 


DULoxetine [Cymbalta] 60 mg PO DAILY 12/09/15 


Lovastatin 20 mg PO DAILY 12/09/15 


Metoprolol Succinate 100 mg PO QPM 12/09/15 


Warfarin [Coumadin] 2 mg PO QDWARFARIN 12/09/15 


Omeprazole [PriLOSEC] 20 mg PO BID 06/21/16 


Gabapentin 1,200 mg PO QPM 07/31/18 


Hydrocodone/Acetaminophen [Norco 7.5-325 Tablet] 1 tab PO TID PRN 12/21/20 


Furosemide [Lasix] 20 mg PO DAILY 01/15/21 


Liothyronine [Cytomel] 5 mcg PO DAILY 01/15/21 


Potassium Chloride 10 meq PO DAILY 01/15/21 











Objective





- Vital Signs/Intake & Output


Reviewed Vital Signs: Yes


Vital Signs: 


                                Vital Signs x48h











  Temp Pulse Resp BP Pulse Ox


 


 01/21/21 08:10  36.7 C  84  14  131/69 H  96


 


 01/21/21 05:30  36.4 C L  66  16  122/65  95











Intake & Output: 


                                 Intake & Output











 01/18/21 01/19/21 01/20/21 01/21/21





 23:59 23:59 23:59 23:59


 


Intake Total 2086 3051.666 2126.327 


 


Output Total 1063 3876 9032 


 


Balance 410 -523.334 51.327 














- Objective


General Appearance: positive: No acute distress, Alert


Eyes Bilateral: positive: Normal inspection, Conjunctivae nml


ENT: positive: ENT inspection nml


Neck: positive: Nml inspection


Respiratory: positive: No respiratory distress.  negative: Wheezes, Rales


Cardiovascular: positive: Irregularly irregular.  negative: Tachycardia, 

Systolic murmur


Abdomen: positive: No distention, Tenderness (He still has mild diffuse 

tenderness.), Other (Abdomen is soft.  Bowel sounds are present but 

hypoactive.).  negative: Guarding, Rebound


Skin: positive: Warm, Dry


Extremities: positive: No pedal edema





- Lab Results


Fish Bones: 


                                 01/21/21 05:27





                                 01/21/21 05:27


Other Labs: 


                               Lab Results x24hrs











  01/21/21 01/21/21 01/21/21 Range/Units





  05:27 05:27 05:27 


 


WBC    5.7  (4.8-10.8)  x10^3/uL


 


RBC    4.36  (4.20-5.40)  10^6/uL


 


Hgb    13.1  (12.0-16.0)  g/dL


 


Hct    41.9  (37.0-47.0)  %


 


MCV    96.1  (81.0-99.0)  fL


 


MCH    30.0  (27.0-31.0)  pg


 


MCHC    31.3 L  (32.0-36.0)  g/dL


 


RDW    13.5  (12.0-15.0)  %


 


Plt Count    268  (130-450)  10^3/uL


 


MPV    10.5  (7.9-10.8)  fL


 


Neut # (Auto)    3.5  (1.5-6.6)  10^3/uL


 


Lymph # (Auto)    1.1 L  (1.5-3.5)  10^3/uL


 


Mono # (Auto)    0.5  (0.0-1.0)  10^3/uL


 


Eos # (Auto)    0.5  (0.0-0.7)  10^3/uL


 


Baso # (Auto)    0.0  (0.0-0.1)  10^3/uL


 


Absolute Nucleated RBC    0.00  x10^3/uL


 


Nucleated RBC %    0.0  /100WBC


 


PT   15.5 H   (9.9-12.6)  secs


 


INR   1.4 H   (0.8-1.2)  


 


Sodium  140    (135-145)  mmol/L


 


Potassium  3.9    (3.5-5.0)  mmol/L


 


Chloride  107    (101-111)  mmol/L


 


Carbon Dioxide  27    (21-32)  mmol/L


 


Anion Gap  6.0    (6-13)  


 


BUN  14    (6-20)  mg/dL


 


Creatinine  0.9    (0.4-1.0)  mg/dL


 


Estimated GFR (MDRD)  60 L    (>89)  


 


Glucose  94    ()  mg/dL


 


Calcium  8.5    (8.5-10.3)  mg/dL














Assessment/Plan





- Problem List


(1) Small bowel obstruction


Impression: 


She is improving.  She did have a bowel movement yesterday.  Still has abdominal

 pain but this is stable.  No nausea or vomiting.  We will keep her on clear 

liquid diet for the time being and look to advance later on this evening.  She 

has lost IV access you will not be placing another IV unless necessary.  We will

 switch her pain medications to oral.  Appreciate General surgery input.








(2) Chronic atrial fibrillation


Impression: 


Stable.  Heart rate was elevated in the 150s yesterday with exertion but today 

it is improved to 90s.  Her home metoprolol has been resumed.  We are continuing

 her on Coumadin although her INR is subtherapeutic.  Continue to monitor on 

telemetry.  Up and out of bed as tolerated.








(3) Hypertension


Impression: 


Her blood pressure is improved.  Currently it is controlled on her current 

hypertensives.








(4) Hypothyroidism


Impression: 


Stable.  Continue home medication.

## 2021-01-21 NOTE — PROVIDER PROGRESS NOTE
Subjective





- Prog Note Date


Prog Note Date: 01/21/21





- Subjective


Pt reports feeling: No change (no nausea or vomiting.  no appetite or bm.)





Objective





- Vital Signs/Intake & Output


Reviewed Vital Signs: Yes


Vital Signs: 


                                Vital Signs x48h











  Temp Pulse Resp BP Pulse Ox


 


 01/21/21 08:10  36.7 C  84  14  131/69 H  96


 


 01/21/21 05:30  36.4 C L  66  16  122/65  95











Intake & Output: 


                                 Intake & Output











 01/18/21 01/19/21 01/20/21 01/21/21





 23:59 23:59 23:59 23:59


 


Intake Total 2085 3051.666 2126.327 


 


Output Total 1675 3575 2075 


 


Balance 410 -523.334 51.327 














- Objective


General Appearance: positive: No acute distress, Alert


Eyes Bilateral: positive: PERRL, EOMI


ENT: positive: No signs of dehydration


Neck: positive: No JVD, Trachea midline


Respiratory: positive: No respiratory distress


Abdomen: positive: Other (soft, mild distension)


Neurologic/Psychiatric: positive: Oriented x3





- Lab Results


Fish Bones: 


                                 01/21/21 05:27





                                 01/21/21 05:27


Other Labs: 


                               Lab Results x24hrs











  01/21/21 01/21/21 01/21/21 Range/Units





  05:27 05:27 05:27 


 


WBC    5.7  (4.8-10.8)  x10^3/uL


 


RBC    4.36  (4.20-5.40)  10^6/uL


 


Hgb    13.1  (12.0-16.0)  g/dL


 


Hct    41.9  (37.0-47.0)  %


 


MCV    96.1  (81.0-99.0)  fL


 


MCH    30.0  (27.0-31.0)  pg


 


MCHC    31.3 L  (32.0-36.0)  g/dL


 


RDW    13.5  (12.0-15.0)  %


 


Plt Count    268  (130-450)  10^3/uL


 


MPV    10.5  (7.9-10.8)  fL


 


Neut # (Auto)    3.5  (1.5-6.6)  10^3/uL


 


Lymph # (Auto)    1.1 L  (1.5-3.5)  10^3/uL


 


Mono # (Auto)    0.5  (0.0-1.0)  10^3/uL


 


Eos # (Auto)    0.5  (0.0-0.7)  10^3/uL


 


Baso # (Auto)    0.0  (0.0-0.1)  10^3/uL


 


Absolute Nucleated RBC    0.00  x10^3/uL


 


Nucleated RBC %    0.0  /100WBC


 


PT   15.5 H   (9.9-12.6)  secs


 


INR   1.4 H   (0.8-1.2)  


 


Sodium  140    (135-145)  mmol/L


 


Potassium  3.9    (3.5-5.0)  mmol/L


 


Chloride  107    (101-111)  mmol/L


 


Carbon Dioxide  27    (21-32)  mmol/L


 


Anion Gap  6.0    (6-13)  


 


BUN  14    (6-20)  mg/dL


 


Creatinine  0.9    (0.4-1.0)  mg/dL


 


Estimated GFR (MDRD)  60 L    (>89)  


 


Glucose  94    ()  mg/dL


 


Calcium  8.5    (8.5-10.3)  mg/dL














Assessment/Plan





- Problem List


(1) Small bowel obstruction


Impression: 


postop day 3 small bowel resection.





pathology pending.  findings concerning for possible neuroendocrine tumor or 

carcinoid.





diet clears.  she has lost iv access.  plan no iv unless she is unable to 

tolerate adequate po

## 2021-01-22 VITALS — SYSTOLIC BLOOD PRESSURE: 124 MMHG | DIASTOLIC BLOOD PRESSURE: 80 MMHG

## 2021-01-22 LAB
ANION GAP SERPL CALCULATED.4IONS-SCNC: 5 MMOL/L (ref 6–13)
BASOPHILS NFR BLD AUTO: 0 10^3/UL (ref 0–0.1)
BASOPHILS NFR BLD AUTO: 0.4 %
BUN SERPL-MCNC: 12 MG/DL (ref 6–20)
CALCIUM UR-MCNC: 8.5 MG/DL (ref 8.5–10.3)
CHLORIDE SERPL-SCNC: 107 MMOL/L (ref 101–111)
CO2 SERPL-SCNC: 27 MMOL/L (ref 21–32)
CREAT SERPLBLD-SCNC: 0.8 MG/DL (ref 0.4–1)
EOSINOPHIL # BLD AUTO: 0.5 10^3/UL (ref 0–0.7)
EOSINOPHIL NFR BLD AUTO: 10.5 %
ERYTHROCYTE [DISTWIDTH] IN BLOOD BY AUTOMATED COUNT: 13.7 % (ref 12–15)
GLUCOSE SERPL-MCNC: 90 MG/DL (ref 70–100)
HGB UR QL STRIP: 11.8 G/DL (ref 12–16)
INR PPP: 1.4 (ref 0.8–1.2)
LYMPHOCYTES # SPEC AUTO: 1.3 10^3/UL (ref 1.5–3.5)
LYMPHOCYTES NFR BLD AUTO: 28.7 %
MCH RBC QN AUTO: 29.5 PG (ref 27–31)
MCHC RBC AUTO-ENTMCNC: 30.4 G/DL (ref 32–36)
MCV RBC AUTO: 97 FL (ref 81–99)
MONOCYTES # BLD AUTO: 0.3 10^3/UL (ref 0–1)
MONOCYTES NFR BLD AUTO: 7.3 %
NEUTROPHILS # BLD AUTO: 2.5 10^3/UL (ref 1.5–6.6)
NEUTROPHILS # SNV AUTO: 4.7 X10^3/UL (ref 4.8–10.8)
NEUTROPHILS NFR BLD AUTO: 52.7 %
PDW BLD AUTO: 10 FL (ref 7.9–10.8)
PLATELET # BLD: 272 10^3/UL (ref 130–450)
PROTHROM ACT/NOR PPP: 14.9 SECS (ref 9.9–12.6)
RBC MAR: 4 10^6/UL (ref 4.2–5.4)

## 2021-01-22 RX ADMIN — PANTOPRAZOLE SODIUM SCH MG: 40 TABLET, DELAYED RELEASE ORAL at 06:49

## 2021-01-22 RX ADMIN — WARFARIN SODIUM SCH MG: 1 TABLET ORAL at 13:46

## 2021-01-22 RX ADMIN — LIOTHYRONINE SODIUM SCH MCG: 5 TABLET ORAL at 08:30

## 2021-01-22 RX ADMIN — HYDROCODONE BITARTRATE AND ACETAMINOPHEN PRN TAB: 7.5; 325 TABLET ORAL at 08:29

## 2021-01-22 RX ADMIN — HYDROCODONE BITARTRATE AND ACETAMINOPHEN PRN TAB: 7.5; 325 TABLET ORAL at 12:13

## 2021-01-22 RX ADMIN — SODIUM CHLORIDE, PRESERVATIVE FREE SCH: 5 INJECTION INTRAVENOUS at 02:05

## 2021-01-22 RX ADMIN — SODIUM CHLORIDE, PRESERVATIVE FREE SCH: 5 INJECTION INTRAVENOUS at 08:31

## 2021-01-22 NOTE — DISCHARGE PLAN
Discharge Plan


Problem Reviewed?: Yes


Disposition: 01 Home, Self Care


Condition: Good


Diet: Regular


Activity Restrictions: No Restrictions


Shower Restrictions: No (ok to shower and get the dressing wet)


Driving Restrictions: No


Plan of Treatment: 


may remove the dressing in 3 to 5 days


leave the underlying tapes on until loose


take miralax, prune juice, milk of magnesia as needed for constipation


Additional Instructions or Follow Up instructions: 


please call the surgery office for fever over 101, incision area redness, 

continued nausea and vomiting, any concerns


please call and make an office appointment 





[phone]


No Smoking: If you smoke, Please STOP!  Call [phone] for help.


Follow-up with: 


Antonina Turner DO [Primary Care Provider] -

## 2021-01-22 NOTE — DISCHARGE SUMMARY
Discharge Summary


Admit Date: 01/15/21


Discharge Date: 01/22/21


Discharging Provider: dutch de anda md


Code Status: Attempt Resuscitation





- DIAGNOSES


Admission Diagnoses: 





small bowel obstruction


Discharge Diagnoses with Status of Each Condition: 





carcinoid tumor small bowel





home in good condition after resection





- HPI


History of Present Illness: 





progressive bowel obstruction symptoms over months





- CONSULTS | PROCEDURES


Procedures: 





open small bowel resection


laguerre for urinary retention





- ALLERGIES


Allergies/Adverse Reactions: 


                                    Allergies











Allergy/AdvReac Type Severity Reaction Status Date / Time


 


iron dextran complex AdvReac  Respiratory Verified 01/15/21 09:04














- MEDICATIONS


Home Medications: 


                                Ambulatory Orders











 Medication  Instructions  Recorded  Confirmed


 


Estradiol 0.5 mg PO DAILY 10/21/13 01/15/21


 


Trazodone HCl 100 mg PO HS PRN 10/21/13 01/15/21


 


Cyclobenzaprine [Flexeril] 10 mg PO DAILY PM 12/09/15 01/15/21


 


DULoxetine [Cymbalta] 60 mg PO DAILY 12/09/15 01/15/21


 


Lovastatin 20 mg PO DAILY 12/09/15 01/15/21


 


Metoprolol Succinate 100 mg PO QPM 12/09/15 01/15/21


 


Warfarin [Coumadin] 2 mg PO QDWARFARIN 12/09/15 01/15/21


 


Omeprazole [PriLOSEC] 20 mg PO BID 06/21/16 01/15/21


 


Gabapentin 1,200 mg PO QPM 07/31/18 01/15/21


 


Hydrocodone/Acetaminophen [Norco 1 tab PO TID PRN 12/21/20 01/16/21





7.5-325 Tablet]   


 


Furosemide [Lasix] 20 mg PO DAILY 01/15/21 01/15/21


 


Liothyronine [Cytomel] 5 mcg PO DAILY 01/15/21 01/15/21


 


Potassium Chloride 10 meq PO DAILY 01/15/21 01/15/21











Home Medications Other | Comments: 





rx vicodin and zofran sent to [facility] pharmacy





- PHYSICAL EXAM AT DISCHARGE


General Appearance: positive: No acute distress, Alert


Eyes Bilateral: positive: Normal inspection, PERRL


Neck: positive: No JVD


Respiratory: positive: No respiratory distress


Abdomen: positive: Non-tender, No distention


Neurologic/Psychiatric: positive: Oriented x3





- LABS


Result Diagrams: 


                                 01/22/21 05:31





                                 01/22/21 05:31





- FOLLOW UP


Follow Up: 





surgery office





please call to make an appointment





[phone]

## 2021-01-22 NOTE — DISCHARGE PLAN
Discharge Plan


Condition: Stable


No Smoking: If you smoke, Please STOP!  Call [phone] for help.


Follow-up with: 


Antonina Turner DO [Primary Care Provider] -

## 2021-04-23 ENCOUNTER — HOSPITAL ENCOUNTER (OUTPATIENT)
Dept: HOSPITAL 76 - LAB.N | Age: 81
Discharge: HOME | End: 2021-04-23
Attending: FAMILY MEDICINE
Payer: MEDICARE

## 2021-04-23 DIAGNOSIS — I48.91: Primary | ICD-10-CM

## 2021-04-23 DIAGNOSIS — Z79.01: ICD-10-CM

## 2021-04-26 ENCOUNTER — HOSPITAL ENCOUNTER (OUTPATIENT)
Dept: HOSPITAL 76 - DI | Age: 81
Discharge: HOME | End: 2021-04-26
Attending: FAMILY MEDICINE
Payer: MEDICARE

## 2021-04-26 DIAGNOSIS — E04.1: Primary | ICD-10-CM

## 2021-04-26 NOTE — ULTRASOUND REPORT
PROCEDURE:  Head or Neck Soft Tissue

 

INDICATIONS:  THYROID NODULE

 

TECHNIQUE:  Real time scanning was performed of the neck region of interest, with image documentation
.  

 

COMPARISON:  None.

 

FINDINGS:

 

Right thyroid measures 5.0 x 1.6 x 1.3 cm.

 

Isthmus measures 2 mm

 

Left thyroid lobe measures 3.5 x 1.1 x 1.2 cm.

 

Within the right mid/lower thyroid, there is a 29 mm x 17 mm x 24 mm diameter predominantly solid nod
ule which is isoechoic and with smooth margins, with associated comet tail artifact.

 

IMPRESSION:  

 

1. 29 mm diameter TIRADS 3 lesion within the right thyroid. Fine-needle aspiration is recommended.

 

Reviewed by: Anaid Helton MD on 4/26/2021 4:52 PM PDT

Approved by: Anaid Helton MD on 4/26/2021 4:52 PM PDT

 

 

Station ID:  [id number]

## 2021-05-14 ENCOUNTER — HOSPITAL ENCOUNTER (OUTPATIENT)
Dept: HOSPITAL 76 - LAB.N | Age: 81
Discharge: HOME | End: 2021-05-14
Attending: FAMILY MEDICINE
Payer: MEDICARE

## 2021-05-14 DIAGNOSIS — I48.91: ICD-10-CM

## 2021-05-14 DIAGNOSIS — Z79.01: Primary | ICD-10-CM

## 2021-06-30 ENCOUNTER — HOSPITAL ENCOUNTER (OUTPATIENT)
Dept: HOSPITAL 76 - LAB.N | Age: 81
Discharge: HOME | End: 2021-06-30
Attending: FAMILY MEDICINE
Payer: MEDICARE

## 2021-06-30 DIAGNOSIS — I48.91: ICD-10-CM

## 2021-06-30 DIAGNOSIS — Z79.01: Primary | ICD-10-CM

## 2021-07-12 ENCOUNTER — HOSPITAL ENCOUNTER (OUTPATIENT)
Dept: HOSPITAL 76 - LAB.WCP | Age: 81
Discharge: HOME | End: 2021-07-12
Attending: FAMILY MEDICINE
Payer: MEDICARE

## 2021-07-12 DIAGNOSIS — M79.672: Primary | ICD-10-CM

## 2021-07-12 LAB
ALBUMIN DIAFP-MCNC: 4.1 G/DL (ref 3.2–5.5)
ALBUMIN/GLOB SERPL: 1.2 {RATIO} (ref 1–2.2)
ALP SERPL-CCNC: 198 IU/L (ref 42–121)
ALT SERPL W P-5'-P-CCNC: 45 IU/L (ref 10–60)
ANION GAP SERPL CALCULATED.4IONS-SCNC: 6 MMOL/L (ref 6–13)
AST SERPL W P-5'-P-CCNC: 52 IU/L (ref 10–42)
BILIRUB BLD-MCNC: 0.5 MG/DL (ref 0.2–1)
BUN SERPL-MCNC: 23 MG/DL (ref 6–20)
CALCIUM UR-MCNC: 8.7 MG/DL (ref 8.5–10.3)
CHLORIDE SERPL-SCNC: 99 MMOL/L (ref 101–111)
CO2 SERPL-SCNC: 32 MMOL/L (ref 21–32)
CREAT SERPLBLD-SCNC: 1.1 MG/DL (ref 0.4–1)
CRP SERPL-MCNC: < 1 MG/DL (ref 0–1)
ERYTHROCYTE [DISTWIDTH] IN BLOOD BY AUTOMATED COUNT: 13.8 % (ref 12–15)
GFRSERPLBLD MDRD-ARVRAT: 48 ML/MIN/{1.73_M2} (ref 89–?)
GLOBULIN SER-MCNC: 3.4 G/DL (ref 2.1–4.2)
GLUCOSE SERPL-MCNC: 86 MG/DL (ref 70–100)
HCT VFR BLD AUTO: 41.9 % (ref 37–47)
HGB UR QL STRIP: 13.2 G/DL (ref 12–16)
MCH RBC QN AUTO: 30.6 PG (ref 27–31)
MCHC RBC AUTO-ENTMCNC: 31.5 G/DL (ref 32–36)
MCV RBC AUTO: 97 FL (ref 81–99)
NEUTROPHILS # SNV AUTO: 4 X10^3/UL (ref 4.8–10.8)
PDW BLD AUTO: 11 FL (ref 7.9–10.8)
PLATELET # BLD: 220 10^3/UL (ref 130–450)
POTASSIUM SERPL-SCNC: 4.3 MMOL/L (ref 3.5–5)
PROT SPEC-MCNC: 7.5 G/DL (ref 6.7–8.2)
RBC MAR: 4.32 10^6/UL (ref 4.2–5.4)
SODIUM SERPLBLD-SCNC: 137 MMOL/L (ref 135–145)
URATE SERPL-MCNC: 5.8 MG/DL (ref 2.6–7.2)

## 2021-07-12 PROCEDURE — 84550 ASSAY OF BLOOD/URIC ACID: CPT

## 2021-07-12 PROCEDURE — 85027 COMPLETE CBC AUTOMATED: CPT

## 2021-07-12 PROCEDURE — 80053 COMPREHEN METABOLIC PANEL: CPT

## 2021-07-12 PROCEDURE — 86140 C-REACTIVE PROTEIN: CPT

## 2021-07-12 PROCEDURE — 85651 RBC SED RATE NONAUTOMATED: CPT

## 2021-07-12 PROCEDURE — 36415 COLL VENOUS BLD VENIPUNCTURE: CPT

## 2021-07-14 ENCOUNTER — HOSPITAL ENCOUNTER (OUTPATIENT)
Dept: HOSPITAL 76 - LAB.N | Age: 81
Discharge: HOME | End: 2021-07-14
Attending: FAMILY MEDICINE
Payer: MEDICARE

## 2021-07-14 DIAGNOSIS — Z79.01: ICD-10-CM

## 2021-07-14 DIAGNOSIS — I48.91: Primary | ICD-10-CM

## 2021-07-21 ENCOUNTER — HOSPITAL ENCOUNTER (OUTPATIENT)
Dept: HOSPITAL 76 - DI | Age: 81
Discharge: HOME | End: 2021-07-21
Attending: OTOLARYNGOLOGY
Payer: MEDICARE

## 2021-07-21 DIAGNOSIS — E04.1: Primary | ICD-10-CM

## 2021-07-21 PROCEDURE — 10005 FNA BX W/US GDN 1ST LES: CPT

## 2021-07-21 RX ADMIN — Medication ONE ML: at 15:34

## 2021-07-21 NOTE — ULTRASOUND REPORT
PROCEDURE:  FNA Bx w/US Gnd 1st les

 

INDICATIONS:  THYROID NODULE

 

TECHNIQUE:  

The indications, alternatives, benefits, risks, and complications of the procedure were explained to 
the patient.  Written informed consent was obtained and placed in the chart.  The area of interest wa
s examined sonographically and a site was chosen for ultrasound guided percutaneous sampling.  The sk
in was prepared and draped in the usual fashion, and anesthetized with 1% lidocaine infiltrated from 
the skin down to the lesion.  Multiple passes were then performed, with contents emptied into an appr
[facility] pathology specimen container.  A bandage was applied to the area of access at completion of t
he study.  

 

COMPARISON:  None.

 

FINDINGS:  

Location(s) of lesion(s) sampled:  Dominant right thyroid nodule

Needles:  25 gauge hypodermic needles.  

Number of passes:  6

Medications:  1% lidocaine for local anaesthesia.  

Complications:  None.  

 

IMPRESSION:  

Successful ultrasound-guided thyroid nodule fine needle aspiration, with cytology results pending.  

 

Reviewed by: Eric Cruz MD on 7/21/2021 2:34 PM PDT

Approved by: Eric Cruz MD on 7/21/2021 2:34 PM PDT

 

 

Station ID:  [id number]

## 2021-08-12 ENCOUNTER — HOSPITAL ENCOUNTER (OUTPATIENT)
Dept: HOSPITAL 76 - DI | Age: 81
Discharge: HOME | End: 2021-08-12
Attending: GENERAL ACUTE CARE HOSPITAL
Payer: MEDICARE

## 2021-08-12 DIAGNOSIS — Z98.82: ICD-10-CM

## 2021-08-12 DIAGNOSIS — Z12.31: Primary | ICD-10-CM

## 2021-08-13 NOTE — MAMMOGRAPHY REPORT
BILATERAL DIGITAL SCREENING MAMMOGRAM 3D/2D WITH AUGMENTATION: 8/12/2021

 

CLINICAL: Routine screening.  

 

Comparison is made to exams dated:  9/27/2019 mammogram, 8/19/2019 mammogram, 8/1/2018 mammogram, 5/1
7/2017 mammogram, 5/12/2016 mammogram, and 2/17/2015 mammogram - [facility].  The t
issue of both breasts is predominantly fatty.  

 

Left breast implant is stable.  

No significant masses, calcifications, or other findings are seen in either breast.  

There has been no significant interval change.

 

IMPRESSION: NEGATIVE

There is no mammographic evidence of malignancy. A 1 year screening mammogram is recommended.  

 

 

This exam was interpreted at Station ID: [id number].  

 

NOTE: For mammograms, a report in lay terms will be sent to the patient. Approximately 15% of breast 
malignancies will not be visualized mammographically. In the management of a palpable breast mass, a 
negative mammogram must not discourage biopsy of a clinically suspicious lesion.

 

Electronically Signed By: Sammy Rivera          

acr/penrad:8/12/2021 15:14:39  

 

 

 

ACR BI-RADS Category 1: Negative 3341F

PARENCHYMAL PATTERN: (F) - The breast(s) demonstrate(s) diffuse fatty replacement.

BI-RADS CATEGORY: (1) - 1

RECOMMENDATION: (ANNUAL)  - Recommend routine annual screening mammography.

[id number]

1 year screening

LATERALITY: (B)

## 2021-09-29 ENCOUNTER — HOSPITAL ENCOUNTER (OUTPATIENT)
Dept: HOSPITAL 76 - LAB | Age: 81
Discharge: HOME | End: 2021-09-29
Attending: FAMILY MEDICINE
Payer: MEDICARE

## 2021-09-29 DIAGNOSIS — Z20.822: ICD-10-CM

## 2021-09-29 DIAGNOSIS — G44.89: Primary | ICD-10-CM

## 2021-10-29 ENCOUNTER — HOSPITAL ENCOUNTER (OUTPATIENT)
Dept: HOSPITAL 76 - LAB.N | Age: 81
Discharge: HOME | End: 2021-10-29
Attending: FAMILY MEDICINE
Payer: MEDICARE

## 2021-10-29 DIAGNOSIS — Z79.01: Primary | ICD-10-CM

## 2021-10-29 DIAGNOSIS — I48.91: ICD-10-CM

## 2021-11-30 ENCOUNTER — HOSPITAL ENCOUNTER (OUTPATIENT)
Dept: HOSPITAL 76 - LAB | Age: 81
Discharge: HOME | End: 2021-11-30
Attending: FAMILY MEDICINE
Payer: MEDICARE

## 2021-11-30 DIAGNOSIS — G44.89: Primary | ICD-10-CM

## 2021-11-30 LAB
CREAT SERPLBLD-SCNC: 1.1 MG/DL (ref 0.4–1)
GFRSERPLBLD MDRD-ARVRAT: 48 ML/MIN/{1.73_M2} (ref 89–?)

## 2021-11-30 PROCEDURE — 82565 ASSAY OF CREATININE: CPT

## 2021-11-30 PROCEDURE — 36415 COLL VENOUS BLD VENIPUNCTURE: CPT

## 2021-11-30 PROCEDURE — 70553 MRI BRAIN STEM W/O & W/DYE: CPT

## 2021-11-30 NOTE — MRI REPORT
PROCEDURE:  Brain W/WO

 

INDICATIONS:  MIXED HEADACHE

 

CONTRAST:  IV CONTRAST: Gadavist ml: 6.3  

 

TECHNIQUE:  

Noncontrast axial T1 spin echo, axial T2 fast spin echo, sagittal and axial FLAIR, coronal T2 fast sp
in echo, axial gradient echo, axial diffusion and ADC through the brain.  After the administration of
 contrast, axial and coronal T1 spin echo with fat saturation through the brain.  

 

COMPARISON:  Correlation is made with prior head CT, 9/27/2020

 

FINDINGS:  

Image quality:  Excellent.  

 

CSF spaces:  Basal cisterns are patent.  No extra-axial fluid collections.  Ventricles are normal in 
size and shape.  

 

Brain:  No midline shift.  No intracranial bleeds or masses.  No abnormal intracranial enhancement.  
There is cerebral volume loss for age.  There is periventricular white matter chronic small vessel is
chemic change.  The brainstem appears normal.  Diffusion-weighted images demonstrate no acute ischemi
c insults.  No chronic ischemic insults.  Normal intravascular flow voids are present.  

 

Skull and face:  Calvarial marrow is normal in signal.  Orbits appear normal.   Incidental note is ma
de of bilateral lens replacements.  

 

Sinuses:  Sinuses and mastoids appear clear.  

 

 

 

 

IMPRESSION:  A cause of headache cannot be seen on these images.

 

No masses or abnormal enhancement can be seen.  

 

No hydrocephalus is seen.

 

No findings of acute or subacute infarction are seen.

 

Reviewed by: Emmanuel Alvarez MD on 11/30/2021 3:28 PM [facility]

Approved by: Emmanuel Alvarez MD on 11/30/2021 3:28 PM [facility]

 

 

Station ID:  [id number]

## 2022-01-21 ENCOUNTER — HOSPITAL ENCOUNTER (OUTPATIENT)
Dept: HOSPITAL 76 - LAB.N | Age: 82
Discharge: HOME | End: 2022-01-21
Attending: FAMILY MEDICINE
Payer: MEDICARE

## 2022-01-21 DIAGNOSIS — Z79.01: ICD-10-CM

## 2022-01-21 DIAGNOSIS — I48.91: Primary | ICD-10-CM

## 2022-02-27 ENCOUNTER — HOSPITAL ENCOUNTER (EMERGENCY)
Dept: HOSPITAL 76 - ED | Age: 82
Discharge: HOME | End: 2022-02-27
Payer: MEDICARE

## 2022-02-27 VITALS — SYSTOLIC BLOOD PRESSURE: 143 MMHG | DIASTOLIC BLOOD PRESSURE: 96 MMHG

## 2022-02-27 DIAGNOSIS — Z23: ICD-10-CM

## 2022-02-27 DIAGNOSIS — S01.81XA: ICD-10-CM

## 2022-02-27 DIAGNOSIS — Z71.85: ICD-10-CM
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
## 2022-02-27 DIAGNOSIS — I48.20: ICD-10-CM

## 2022-02-27 DIAGNOSIS — Z79.01: ICD-10-CM

## 2022-02-27 DIAGNOSIS — S09.90XA: Primary | ICD-10-CM

## 2022-02-27 LAB
ANION GAP SERPL CALCULATED.4IONS-SCNC: 9 MMOL/L (ref 6–13)
BASOPHILS NFR BLD AUTO: 0 10^3/UL (ref 0–0.1)
BASOPHILS NFR BLD AUTO: 0.6 %
BUN SERPL-MCNC: 16 MG/DL (ref 6–20)
CALCIUM UR-MCNC: 9 MG/DL (ref 8.5–10.3)
CHLORIDE SERPL-SCNC: 102 MMOL/L (ref 101–111)
CO2 SERPL-SCNC: 27 MMOL/L (ref 21–32)
CREAT SERPLBLD-SCNC: 1.2 MG/DL (ref 0.4–1)
EOSINOPHIL # BLD AUTO: 0.1 10^3/UL (ref 0–0.7)
EOSINOPHIL NFR BLD AUTO: 1.4 %
ERYTHROCYTE [DISTWIDTH] IN BLOOD BY AUTOMATED COUNT: 13.6 % (ref 12–15)
GFRSERPLBLD MDRD-ARVRAT: 43 ML/MIN/{1.73_M2} (ref 89–?)
GLUCOSE SERPL-MCNC: 120 MG/DL (ref 70–100)
HCT VFR BLD AUTO: 41.9 % (ref 37–47)
HGB UR QL STRIP: 13.7 G/DL (ref 12–16)
INR PPP: 1.2 (ref 0.8–1.2)
LYMPHOCYTES # SPEC AUTO: 1.3 10^3/UL (ref 1.5–3.5)
LYMPHOCYTES NFR BLD AUTO: 25 %
MCH RBC QN AUTO: 30.8 PG (ref 27–31)
MCHC RBC AUTO-ENTMCNC: 32.7 G/DL (ref 32–36)
MCV RBC AUTO: 94.2 FL (ref 81–99)
MONOCYTES # BLD AUTO: 0.3 10^3/UL (ref 0–1)
MONOCYTES NFR BLD AUTO: 6.3 %
NEUTROPHILS # BLD AUTO: 3.4 10^3/UL (ref 1.5–6.6)
NEUTROPHILS # SNV AUTO: 5.1 X10^3/UL (ref 4.8–10.8)
NEUTROPHILS NFR BLD AUTO: 66.3 %
NRBC # BLD AUTO: 0 /100WBC
NRBC # BLD AUTO: 0 X10^3/UL
PDW BLD AUTO: 10.2 FL (ref 7.9–10.8)
PLATELET # BLD: 202 10^3/UL (ref 130–450)
POTASSIUM SERPL-SCNC: 3.6 MMOL/L (ref 3.5–5)
PROTHROM ACT/NOR PPP: 13.5 SECS (ref 9.9–12.6)
RBC MAR: 4.45 10^6/UL (ref 4.2–5.4)
SODIUM SERPLBLD-SCNC: 138 MMOL/L (ref 135–145)

## 2022-02-27 PROCEDURE — 85610 PROTHROMBIN TIME: CPT

## 2022-02-27 PROCEDURE — 99284 EMERGENCY DEPT VISIT MOD MDM: CPT

## 2022-02-27 PROCEDURE — 90471 IMMUNIZATION ADMIN: CPT

## 2022-02-27 PROCEDURE — 80048 BASIC METABOLIC PNL TOTAL CA: CPT

## 2022-02-27 PROCEDURE — 93005 ELECTROCARDIOGRAM TRACING: CPT

## 2022-02-27 PROCEDURE — 85025 COMPLETE CBC W/AUTO DIFF WBC: CPT

## 2022-02-27 PROCEDURE — 99282 EMERGENCY DEPT VISIT SF MDM: CPT

## 2022-02-27 PROCEDURE — 72125 CT NECK SPINE W/O DYE: CPT

## 2022-02-27 PROCEDURE — 12011 RPR F/E/E/N/L/M 2.5 CM/<: CPT

## 2022-02-27 PROCEDURE — 70450 CT HEAD/BRAIN W/O DYE: CPT

## 2022-02-27 PROCEDURE — 90715 TDAP VACCINE 7 YRS/> IM: CPT

## 2022-02-27 PROCEDURE — 36415 COLL VENOUS BLD VENIPUNCTURE: CPT

## 2022-02-27 NOTE — CT REPORT
PROCEDURE:  HEAD WO

 

INDICATIONS:  head injury

 

TECHNIQUE:  

Noncontrast 4.5 mm thick angled axial sections acquired from the foramen magnum to the vertex.  For r
adiation dose reduction, the following was used:  automated exposure control, adjustment of mA and/or
 kV according to patient size.

 

COMPARISON:  None.

 

FINDINGS:  

Image quality:  Excellent.  

 

CSF spaces:  Basal cisterns are patent.  No extra-axial fluid collections.  Ventricles are normal in 
size and shape.  

 

Brain:  No midline shift.  No intracranial masses or hemorrhage.  Gray-white matter interface is norm
al.  There is diffuse cortical volume loss with associated ex vacuo dilatation of the bilateral ventr
icles.   No acute intracranial hemorrhage or evidence of transcortical infarction. Scattered bilatera
l periventricular white matter hypoattenuation likely sequela from chronic small vessel ischemic dise
ase. Atherosclerotic calcifications within the intracranial segments of the bilateral internal caroti
d arteries are noted. 

 

Skull and face:  Calvarium and visualized facial bones are intact, without suspicious lesions.  

 

Sinuses:  Visualized sinuses and mastoids are clear.  

 

IMPRESSION:  

CT head without acute intracranial abnormalities or acute calvarial fractures.

Age-related senescent changes and sequela of chronic small vessel ischemic disease.

 

Reviewed by: Darin Muir MD on 2/27/2022 3:02 PM [facility]

Approved by: Darin Muir MD on 2/27/2022 3:02 PM [facility]

 

 

Station ID:  [id number]

## 2022-02-27 NOTE — CT REPORT
PROCEDURE:  CERVICAL SPINE WO

 

INDICATIONS:  head injury

 

TECHNIQUE:  

Noncontrast 3 mm thick sections acquired from the skull base to the T4 level.  Sagittal and coronal r
eformats were then constructed.  For radiation dose reduction, the following was used:  automated exp
osure control, adjustment of mA and/or kV according to patient size.

 

COMPARISON:  None.

 

FINDINGS:  

Image quality: Diagnostic.  

 

Bones:  No acute fractures or dislocations.  Visualized superior ribs are intact.  Straightening of n
ormal cervical lordosis. Severe multilevel cervical spondylosis most pronounced from C3-4 through C6-
7. There is severe disc space loss at C4-5. Prominent anterior and posterior endplate osteophyte form
ation seen. Findings result in varying degrees of moderate-severe bilateral neural foraminal narrowin
g as well as mild spinal canal stenosis at C3-4 and moderate spinal canal stenosis at C4-5. No acute 
compression fractures. Craniocervical junction is intact.

 

Soft tissues:  Prevertebral soft tissues are normal in thickness.  No paravertebral hematomas.  No ap
ical pneumothoraces.  

 

IMPRESSION:  

CT cervical spine without acute fracture or traumatic malalignment.

 

Severe multilevel cervical spondylosis most pronounced from C3-4 through C6-7.

 

Straightening of cervical lordosis likely related to positioning and/or concurrent muscle spasms.

 

Reviewed by: Darin Muir MD on 2/27/2022 3:07 PM [facility]

Approved by: Darin Muir MD on 2/27/2022 3:07 PM [facility]

 

 

Station ID:  [id number]

## 2022-02-27 NOTE — ED PHYSICIAN DOCUMENTATION
PD HPI HEAD INJURY





- Stated complaint


Stated Complaint: FALL/WOUND ON CHIN





- History obtained from


History obtained from: Patient





- Additional information


Additional information: 





81-year-old woman who is on warfarin for atrial fibrillation fell sometime 

during the night.  She does not actually remember it and is unsure if she passed

out but thinks most likely she tripped on her dog.  She has a laceration under 

the chin.  Last tetanus is unknown.  Denies headache.  No other injuries.





Review of Systems


Constitutional: denies: Fever, Chills


Nose: denies: Rhinorrhea / runny nose, Congestion


Cardiac: reports: Palpitations


Respiratory: denies: Dyspnea, Cough





PD PAST MEDICAL HISTORY





- Past Medical History


Cardiovascular: Atrial fibrillation


Respiratory: None


Endocrine/Autoimmune: HyPOthyroidism


GI: None, Hiatal hernia


: None


HEENT: None


Psych: None


Musculoskeletal: Osteoarthritis, Fibromyalgia


Derm: None





- Past Surgical History


Past Surgical History: Yes


General: Colonoscopy, EGD


Ortho: Knee replacement, Spine surgery


/GYN: Hysterectomy


HEENT: Cataracts, Tonsil/Adenoidectomy





- Present Medications


Home Medications: 


                                Ambulatory Orders











 Medication  Instructions  Recorded  Confirmed


 


Estradiol 0.5 mg PO DAILY 10/21/13 01/19/22


 


Trazodone HCl 100 mg PO HS PRN 10/21/13 01/19/22


 


Cyclobenzaprine [Flexeril] 10 mg PO DAILY PM 12/09/15 01/19/22


 


DULoxetine [Cymbalta] 60 mg PO DAILY 12/09/15 01/19/22


 


Lovastatin 20 mg PO DAILY 12/09/15 01/19/22


 


Metoprolol Succinate 100 mg PO QPM 12/09/15 01/19/22


 


Warfarin [Coumadin] 2 mg PO QDWARFARIN 12/09/15 01/19/22


 


Omeprazole [PriLOSEC] 20 mg PO BID 06/21/16 01/19/22


 


Gabapentin 1,200 mg PO QPM 07/31/18 01/19/22


 


Hydrocodone/Acetaminophen [Norco 1 tab PO TID PRN 12/21/20 01/19/22





7.5-325 Tablet]   


 


Furosemide [Lasix] 20 mg PO DAILY 01/15/21 01/19/22


 


Liothyronine [Cytomel] 5 mcg PO DAILY 01/15/21 01/19/22


 


Potassium Chloride 10 meq PO DAILY 01/15/21 01/19/22


 


Diphenoxylate/Atropine [Lomotil] 2.5 mg ORAL Q6H PRN #90 tab MDD 8 02/02/22 





 tabs  














- Allergies


Allergies/Adverse Reactions: 


                                    Allergies











Allergy/AdvReac Type Severity Reaction Status Date / Time


 


iron dextran complex AdvReac  Respiratory Verified 02/27/22 15:08














- Social History


Does the pt smoke?: No


Smoking Status: Never smoker


Does the pt drink ETOH?: No


Does the pt have substance abuse?: No





- Immunizations


Immunizations are current?: Yes





- POLST


Patient has POLST: No





PD ED PE NORMAL





- Vitals


Vital signs reviewed: Yes





- General


General: Alert and oriented X 3, No acute distress





- HEENT


HEENT: PERRL, EOMI, Other (2 cm very gaping laceration in the submental area 

with no facial bony tenderness or tooth tenderness.)





- Neck


Neck: No bony TTP, C-Spine cleared by NEXUS criteria (But will CT given advanced

 age)





- Cardiac


Cardiac: Other (Irregularly irregular, no murmur)





- Respiratory


Respiratory: No respiratory distress, Clear bilaterally





- Abdomen


Abdomen: Soft, Non tender





- Back


Back: No CVA TTP, No spinal TTP





- Derm


Derm: Normal color, Warm and dry





- Neuro


Neuro: Alert and oriented X 3, No motor deficit, No sensory deficit, Normal 

speech


Eye Opening: Spontaneous


Motor: Obeys Commands


Verbal: Oriented


GCS Score: 15





Results





- Vitals


Vitals: 


                               Vital Signs - 24 hr











  02/27/22 02/27/22





  14:57 16:21


 


Temperature 36.7 C 36.6 C


 


Heart Rate 72 74


 


Respiratory 16 16





Rate  


 


Blood Pressure 123/94 H 143/96 H


 


O2 Saturation 97 97








                                     Oxygen











O2 Source []                   Room air


 


O2 Source []                   Room air


 


O2 Source                      Room air

















- EKG (time done)


  ** 1508


Rate: Rate (enter#) (74)


Rhythm: Atrial fibrillation


Axis: Normal


QRS: Normal


Ischemia: Normal ST segments





- Labs


Labs: 


                                Laboratory Tests











  02/27/22 02/27/22 02/27/22





  15:06 15:06 15:06


 


WBC  5.1  


 


RBC  4.45  


 


Hgb  13.7  


 


Hct  41.9  


 


MCV  94.2  


 


MCH  30.8  


 


MCHC  32.7  


 


RDW  13.6  


 


Plt Count  202  


 


MPV  10.2  


 


Neut # (Auto)  3.4  


 


Lymph # (Auto)  1.3 L  


 


Mono # (Auto)  0.3  


 


Eos # (Auto)  0.1  


 


Baso # (Auto)  0.0  


 


Absolute Nucleated RBC  0.00  


 


Nucleated RBC %  0.0  


 


PT   13.5 H 


 


INR   1.2 


 


Sodium    138


 


Potassium    3.6


 


Chloride    102


 


Carbon Dioxide    27


 


Anion Gap    9.0


 


BUN    16


 


Creatinine    1.2 H


 


Estimated GFR (MDRD)    43 L


 


Glucose    120 H


 


Calcium    9.0














Procedures





- Laceration (location)


  ** Chin


Length in cm: 2


Wound type: Curved, Into subcut fat


Anesthesia: Lidocaine 1% with epi


Wound preparation: Irrigated copiously NS


Skin layer closure: Nylon, Interrupted, Size #-0 - enter number (5-0), Sutures -

 enter # (5)


Other: Patient tolerated well, No complications, Neurovascular intact, Tetanus 

booster given





PD MEDICAL DECISION MAKING





- ED course


ED course: 





81-year-old woman fell during the night injuring her chin.  It was loosely 

closed after washing out.  No evidence of facial fracture.  She is on warfarin 

so was triggered as a "modified trauma."  CT of the head and cervical spine with

 were without acute trauma.  Her INR is only 1.2.  She states she is compliant 

with her warfarin.





Departure





- Departure


Disposition: 01 Home, Self Care


Clinical Impression: 


 Chronic atrial fibrillation, Head injury, Chin laceration





Condition: Stable


Record reviewed to determine appropriate education?: Yes


Instructions:  ED Head Injury Closed, ED Laceration Facial Sutr Tape


Comments: 


Your INR ("Coumadin level") was low today at 1.2.  This actually may be good in 

the setting of a head injury, but you are not being protected against stroke as 

you should be.  Tomorrow take an extra dose of your warfarin and keep taking it 

and have your INR checked again in a few days.  Follow-up with your doctor 

Wednesday or Thursday for recheck.  Return for new or worsening symptoms.





Thankfully though CAT scans of the head and neck showed no evidence of acute 

trauma and her other labs are unremarkable.





Come back for any signs of infection which would include: Redness, swelling, 

drainage, increased pain, or fevers.


You can wash it soap and water. Keep it covered and moist with bacitracin 

ointment which is available over the counter; avoid neosporin.


Follow-up with your physician in About 7 days for suture removal.


Discharge Date/Time: 02/27/22 16:22

## 2022-06-12 ENCOUNTER — HOSPITAL ENCOUNTER (OUTPATIENT)
Dept: HOSPITAL 76 - EMS | Age: 82
Discharge: ANOTHER HOSPITAL | End: 2022-06-12
Payer: MEDICARE

## 2022-06-12 ENCOUNTER — HOSPITAL ENCOUNTER (OUTPATIENT)
Dept: HOSPITAL 76 - EMS | Age: 82
Discharge: LEFT BEFORE DISCHARGE | End: 2022-06-12
Payer: MEDICARE

## 2022-06-12 ENCOUNTER — HOSPITAL ENCOUNTER (EMERGENCY)
Dept: HOSPITAL 76 - ED | Age: 82
LOS: 1 days | Discharge: HOME | End: 2022-06-13
Payer: MEDICARE

## 2022-06-12 DIAGNOSIS — R94.31: ICD-10-CM

## 2022-06-12 DIAGNOSIS — R51.9: Primary | ICD-10-CM

## 2022-06-12 DIAGNOSIS — R79.89: ICD-10-CM

## 2022-06-12 DIAGNOSIS — R53.1: ICD-10-CM

## 2022-06-12 DIAGNOSIS — Z20.822: ICD-10-CM

## 2022-06-12 DIAGNOSIS — R11.2: ICD-10-CM

## 2022-06-12 DIAGNOSIS — R79.1: ICD-10-CM

## 2022-06-12 LAB
ALBUMIN DIAFP-MCNC: 4.5 G/DL (ref 3.2–5.5)
ALBUMIN/GLOB SERPL: 1.2 {RATIO} (ref 1–2.2)
ALP SERPL-CCNC: 149 IU/L (ref 42–121)
ALT SERPL W P-5'-P-CCNC: 27 IU/L (ref 10–60)
ANION GAP SERPL CALCULATED.4IONS-SCNC: 10 MMOL/L (ref 6–13)
AST SERPL W P-5'-P-CCNC: 32 IU/L (ref 10–42)
BASOPHILS NFR BLD AUTO: 0 10^3/UL (ref 0–0.1)
BASOPHILS NFR BLD AUTO: 0.4 %
BILIRUB BLD-MCNC: 0.8 MG/DL (ref 0.2–1)
BUN SERPL-MCNC: 12 MG/DL (ref 6–20)
CALCIUM UR-MCNC: 9.6 MG/DL (ref 8.5–10.3)
CHLORIDE SERPL-SCNC: 103 MMOL/L (ref 101–111)
CO2 SERPL-SCNC: 26 MMOL/L (ref 21–32)
EOSINOPHIL # BLD AUTO: 0 10^3/UL (ref 0–0.7)
EOSINOPHIL NFR BLD AUTO: 0.7 %
ERYTHROCYTE [DISTWIDTH] IN BLOOD BY AUTOMATED COUNT: 13.6 % (ref 12–15)
GLOBULIN SER-MCNC: 3.9 G/DL (ref 2.1–4.2)
GLUCOSE SERPL-MCNC: 123 MG/DL (ref 70–100)
HCT VFR BLD AUTO: 48 % (ref 37–47)
HGB UR QL STRIP: 15.3 G/DL (ref 12–16)
INR PPP: 1.2 (ref 0.8–1.2)
LYMPHOCYTES # SPEC AUTO: 1.2 10^3/UL (ref 1.5–3.5)
LYMPHOCYTES NFR BLD AUTO: 22.2 %
MCH RBC QN AUTO: 30.2 PG (ref 27–31)
MCHC RBC AUTO-ENTMCNC: 31.9 G/DL (ref 32–36)
MCV RBC AUTO: 94.9 FL (ref 81–99)
MONOCYTES # BLD AUTO: 0.4 10^3/UL (ref 0–1)
MONOCYTES NFR BLD AUTO: 7.2 %
NEUTROPHILS # BLD AUTO: 3.8 10^3/UL (ref 1.5–6.6)
NEUTROPHILS # SNV AUTO: 5.5 X10^3/UL (ref 4.8–10.8)
NEUTROPHILS NFR BLD AUTO: 69.3 %
NRBC # BLD AUTO: 0 /100WBC
NRBC # BLD AUTO: 0 X10^3/UL
PDW BLD AUTO: 10.5 FL (ref 7.9–10.8)
PLATELET # BLD: 217 10^3/UL (ref 130–450)
POTASSIUM SERPL-SCNC: 4.1 MMOL/L (ref 3.5–5)
PROT SPEC-MCNC: 8.4 G/DL (ref 6.7–8.2)
PROTHROM ACT/NOR PPP: 13.7 SECS (ref 9.9–12.6)
RBC MAR: 5.06 10^6/UL (ref 4.2–5.4)
SODIUM SERPLBLD-SCNC: 139 MMOL/L (ref 135–145)

## 2022-06-12 PROCEDURE — 70450 CT HEAD/BRAIN W/O DYE: CPT

## 2022-06-12 PROCEDURE — 85025 COMPLETE CBC W/AUTO DIFF WBC: CPT

## 2022-06-12 PROCEDURE — 36415 COLL VENOUS BLD VENIPUNCTURE: CPT

## 2022-06-12 PROCEDURE — 99284 EMERGENCY DEPT VISIT MOD MDM: CPT

## 2022-06-12 PROCEDURE — 96374 THER/PROPH/DIAG INJ IV PUSH: CPT

## 2022-06-12 PROCEDURE — 87635 SARS-COV-2 COVID-19 AMP PRB: CPT

## 2022-06-12 PROCEDURE — 84484 ASSAY OF TROPONIN QUANT: CPT

## 2022-06-12 PROCEDURE — 85610 PROTHROMBIN TIME: CPT

## 2022-06-12 PROCEDURE — 96361 HYDRATE IV INFUSION ADD-ON: CPT

## 2022-06-12 PROCEDURE — 96372 THER/PROPH/DIAG INJ SC/IM: CPT

## 2022-06-12 PROCEDURE — 71045 X-RAY EXAM CHEST 1 VIEW: CPT

## 2022-06-12 PROCEDURE — 80053 COMPREHEN METABOLIC PANEL: CPT

## 2022-06-12 PROCEDURE — 93005 ELECTROCARDIOGRAM TRACING: CPT

## 2022-06-12 NOTE — ED PHYSICIAN DOCUMENTATION
History of Present Illness





- Stated complaint


Stated Complaint: HIGH BP, HA, NAUSEA





- Chief complaint


Chief Complaint: Cardiac





- History obtained from


History obtained from: Patient





- Additonal information


Additional information: 


Patient is a 81-year-old female with a history of atrial fibrillation On 

Coumadin presenting for evaluation of a headache that has been present since 

early this morning. Patient describes it as dull and throbbing and located to 

the top of her head.  Nothing makes it better or worse.  It has not changed 

throughout the day and she denies thunderclap in intensity at onset or exertion 

at onset.She did try baby aspirin without improvement in her symptoms.  She 

reports associated nausea.  She denies a previous history of headaches.She 

denies visual disturbances, chest pain, difficulty breathing, abdominal pain, 

focal weakness.She activated EMS this evening and when they checked her blood 

pressure they noted it to be elevated in the 170s to 180s.  Has since 

improved.She denies head injuries. She does report today that she has not had 

much to eat or drink as she is overall just felt unwell and her legs felt very 

weak earlier today.








Review of Systems


Constitutional: denies: Fever


Eyes: denies: Decreased vision


Nose: denies: Congestion


Cardiac: denies: Chest pain / pressure, Palpitations


Respiratory: denies: Dyspnea, Cough


GI: reports: Nausea.  denies: Vomiting


: denies: Dysuria


Skin: denies: Rash


Neurologic: reports: Headache.  denies: Syncope, Head injury





PD PAST MEDICAL HISTORY





- Past Medical History


Cardiovascular: Atrial fibrillation


Respiratory: None


Endocrine/Autoimmune: HyPOthyroidism


GI: None, Hiatal hernia


: None


HEENT: None


Psych: None


Musculoskeletal: Osteoarthritis, Fibromyalgia


Derm: None





- Past Surgical History


Past Surgical History: Yes


General: Colonoscopy, EGD


Ortho: Knee replacement, Spine surgery


/GYN: Hysterectomy


HEENT: Cataracts, Tonsil/Adenoidectomy





- Present Medications


Home Medications: 


                                Ambulatory Orders











 Medication  Instructions  Recorded  Confirmed


 


Estradiol 0.5 mg PO DAILY 10/21/13 06/01/22


 


Trazodone HCl 100 mg PO HS PRN 10/21/13 06/01/22


 


Cyclobenzaprine [Flexeril] 10 mg PO DAILY PM 12/09/15 06/01/22


 


DULoxetine [Cymbalta] 60 mg PO DAILY 12/09/15 06/01/22


 


Lovastatin 20 mg PO DAILY 12/09/15 06/01/22


 


Metoprolol Succinate 100 mg PO QPM 12/09/15 06/01/22


 


Warfarin [Coumadin] 2 mg PO QDWARFARIN 12/09/15 06/01/22


 


Omeprazole [PriLOSEC] 20 mg PO BID 06/21/16 06/01/22


 


Gabapentin 1,200 mg PO QPM 07/31/18 06/01/22


 


Hydrocodone/Acetaminophen [Norco 1 tab PO TID PRN 12/21/20 06/01/22





7.5-325 Tablet]   


 


Furosemide [Lasix] 20 mg PO DAILY 01/15/21 06/01/22


 


Liothyronine [Cytomel] 5 mcg PO DAILY 01/15/21 06/01/22


 


Potassium Chloride 10 meq PO DAILY 01/15/21 06/01/22


 


Diphenoxylate/Atropine [Lomotil] 2.5 mg ORAL Q6H PRN #90 tab MDD 8 02/02/22 06/01/22





 tabs  


 


Warfarin Sodium [Coumadin] 2 mg PO DAILY #30 tablet 06/13/22 














- Allergies


Allergies/Adverse Reactions: 


                                    Allergies











Allergy/AdvReac Type Severity Reaction Status Date / Time


 


iron dextran complex AdvReac  Respiratory Verified 06/12/22 23:03














- Social History


Does the pt smoke?: No


Smoking Status: Never smoker


Does the pt drink ETOH?: No


Does the pt have substance abuse?: No





- Immunizations


Immunizations are current?: Yes





- POLST


Patient has POLST: No





PD ED PE NORMAL





- General


General: Alert and oriented X 3, No acute distress, Well developed/nourished





- HEENT


HEENT: Atraumatic, PERRL, EOMI, Moist mucous membranes, Pharynx benign





- Neck


Neck: Supple, no meningeal sign





- Cardiac


Cardiac: No murmur, Strong equal pulses, Other (Irregularly irregular, Normal 

rate)





- Respiratory


Respiratory: No respiratory distress, Clear bilaterally





- Abdomen


Abdomen: Normal bowel sounds, Soft, Non tender, Non distended





- Derm


Derm: Warm and dry





- Extremities


Extremities: No edema





- Neuro


Neuro: Alert and oriented X 3, CNs 2-12 intact, No motor deficit, No sensory 

deficit, Normal speech, Other (Normal finger-to-nose bilaterally)


Eye Opening: Spontaneous


Motor: Obeys Commands


Verbal: Oriented


GCS Score: 15





- Psych


Psych: Normal mood





Results





- Vitals


Vitals: 


                               Vital Signs - 24 hr











  06/12/22 06/13/22 06/13/22





  22:29 00:36 01:03


 


Temperature 36.4 C L  


 


Heart Rate 112 H 98 100


 


Respiratory 18 19 17





Rate   


 


Blood Pressure 152/105 H 177/111 H 166/115 H


 


O2 Saturation 95 100 100














  06/13/22 06/13/22





  04:34 06:00


 


Temperature  


 


Heart Rate 92 86


 


Respiratory 16 20





Rate  


 


Blood Pressure 180/104 H 171/104 H


 


O2 Saturation 99 98








                                     Oxygen











O2 Source []                   Room air


 


O2 Source []                   Room air


 


O2 Source                      Room air

















- EKG (time done)


  ** 2240


Rate: Rate (enter#) (90)


Rhythm: Atrial fibrillation


Ischemia: T wave inversion (Inferior and anterior leads)


Compare to prior EKG: Other (T waves generally flattened in previous EKG, 

inverted now in V2 and V3)





- Labs


Labs: 


                                Laboratory Tests











  06/12/22 06/12/22 06/12/22





  23:35 23:35 23:35


 


WBC  5.5  


 


RBC  5.06  


 


Hgb  15.3  


 


Hct  48.0 H  


 


MCV  94.9  


 


MCH  30.2  


 


MCHC  31.9 L  


 


RDW  13.6  


 


Plt Count  217  


 


MPV  10.5  


 


Neut # (Auto)  3.8  


 


Lymph # (Auto)  1.2 L  


 


Mono # (Auto)  0.4  


 


Eos # (Auto)  0.0  


 


Baso # (Auto)  0.0  


 


Absolute Nucleated RBC  0.00  


 


Nucleated RBC %  0.0  


 


PT   13.7 H 


 


INR   1.2 


 


Sodium    139


 


Potassium    4.1


 


Chloride    103


 


Carbon Dioxide    26


 


Anion Gap    10.0


 


BUN    12


 


Creatinine    1.0


 


Estimated GFR (MDRD)    53 L


 


Glucose    123 H


 


Calcium    9.6


 


Total Bilirubin    0.8


 


AST    32


 


ALT    27


 


Alkaline Phosphatase    149 H


 


Troponin I High Sens   


 


Total Protein    8.4 H


 


Albumin    4.5


 


Globulin    3.9


 


Albumin/Globulin Ratio    1.2


 


SARS-CoV-2 (PCR)   














  06/12/22 06/13/22 06/13/22





  23:35 01:07 01:45


 


WBC   


 


RBC   


 


Hgb   


 


Hct   


 


MCV   


 


MCH   


 


MCHC   


 


RDW   


 


Plt Count   


 


MPV   


 


Neut # (Auto)   


 


Lymph # (Auto)   


 


Mono # (Auto)   


 


Eos # (Auto)   


 


Baso # (Auto)   


 


Absolute Nucleated RBC   


 


Nucleated RBC %   


 


PT   


 


INR   


 


Sodium   


 


Potassium   


 


Chloride   


 


Carbon Dioxide   


 


Anion Gap   


 


BUN   


 


Creatinine   


 


Estimated GFR (MDRD)   


 


Glucose   


 


Calcium   


 


Total Bilirubin   


 


AST   


 


ALT   


 


Alkaline Phosphatase   


 


Troponin I High Sens  66.4 H*  86.5 H* 


 


Total Protein   


 


Albumin   


 


Globulin   


 


Albumin/Globulin Ratio   


 


SARS-CoV-2 (PCR)    NOT DETECTED














  06/13/22





  06:00


 


WBC 


 


RBC 


 


Hgb 


 


Hct 


 


MCV 


 


MCH 


 


MCHC 


 


RDW 


 


Plt Count 


 


MPV 


 


Neut # (Auto) 


 


Lymph # (Auto) 


 


Mono # (Auto) 


 


Eos # (Auto) 


 


Baso # (Auto) 


 


Absolute Nucleated RBC 


 


Nucleated RBC % 


 


PT 


 


INR 


 


Sodium 


 


Potassium 


 


Chloride 


 


Carbon Dioxide 


 


Anion Gap 


 


BUN 


 


Creatinine 


 


Estimated GFR (MDRD) 


 


Glucose 


 


Calcium 


 


Total Bilirubin 


 


AST 


 


ALT 


 


Alkaline Phosphatase 


 


Troponin I High Sens  85.0 H*


 


Total Protein 


 


Albumin 


 


Globulin 


 


Albumin/Globulin Ratio 


 


SARS-CoV-2 (PCR) 














PD MEDICAL DECISION MAKING





- ED course


Complexity details: reviewed results, re-evaluated patient


ED course: 


Patient Presenting for evaluation of elevated blood pressure, headache and 

generalized weakness with nausea.  EKG with new T wave inversions in anterior 

leads.CT brain is negative for bleed.Labs with elevated troponin.  Repeat 

troponin is also uptrending.  Patient denies chest pain or trouble 

breathing.Patient has a cardiologist through [facility] (Dr. Bergeron) - She 

denies previous cardiac stents.Discussed need For transfer to facility with 

cardiology due To uptrending troponin and EKG changes.  Patient is agreeable.





Throughout the early morning hours, patient asked often about going home as she 

was feeling fine. No excepting hospitals have been able to be found.  Patient 

again denies any chest pain or trouble breathing.She does not feel weak and has 

been ambulating on her own without difficulty.  She no longer has a headache. 

Third troponin was obtained And is unchanged from her second troponin which is 

reassuring.I did discuss the risks of leaving prior to having a cardiology 

evaluationGiven her abnormal troponin and newly inverted T waves on EKG.Patient 

is aware that we are not able to rule out Heart attack at this time.  She Is 

insistent that she would like to go home this morning and feels comfortable 

calling her cardiologist for close follow-up.  She is aware that she is welcome 

to return to the emergency department at any time should any new symptoms occur.

 She does report running low on her Coumadin and so I will give her a 

prescription for refill as she believes her previous PCP is no longer at the 

clinic. 





Departure





- Departure


Disposition: 01 Home, Self Care


Clinical Impression: 


 Weakness, Elevated troponin, Abnormal EKG, Subtherapeutic international 

normalized ratio (INR)





Condition: Stable


Instructions:  ED Weakness [facility] [place]


Prescriptions: 


Warfarin Sodium [Coumadin] 2 mg PO DAILY #30 tablet


Comments: 


You were evaluated for a headache and weakness.A CT scan of your brain was done 

which did not show any bleeding in the brain. We also evaluated your heart as a 

possible cause of the weakness. Your EKG did show some subtle changes from your 

most recent EKG.  This could be a sign of possibleHeart injury.  We also checked

 blood work in regards to your heart including a marker to check for heart 

damage.  This marker is abnormal for you.  We did recommend being transferred to

 a facility to be evaluated by a cardiologist.  Unfortunately overnight we were 

not able to find an accepting hospital with an open bed. You have requested to 

go home this morning.  Your last heart marker is not increasing Which is a good 

sign but it does not rule out That you may have had a small heart attack Which 

would be a reason for you to stay in the hospital. Please call your Cardiologist

 this morning for close follow-up.  If it anytime you have any new or worsening 

symptoms, please return to the emergency department.  I have also sent a refill 

of your warfarin to [facility] in [place].


Discharge Date/Time: 06/13/22 07:19

## 2022-06-12 NOTE — CT REPORT
PROCEDURE:  HEAD WO

 

INDICATIONS:  headache, on warfarin

 

TECHNIQUE:  

Noncontrast 4.5 mm thick angled axial sections acquired from the foramen magnum to the vertex.  For r
adiation dose reduction, the following was used:  automated exposure control, adjustment of mA and/or
 kV according to patient size.

 

COMPARISON:  CT head 2/27/2022.

 

FINDINGS:  

Image quality:  Excellent.  

 

CSF spaces:  There is moderate cerebral volume loss with prominence of the ventricles and sulci. Basa
l cisterns are patent.  No extra-axial fluid collections.  

 

Brain:  No intracranial hemorrhage, mass, or mass effect. Gray-white matter interface is preserved. T
here are subcortical and periventricular white matter hypodensities consistent with mild chronic smal
l vessel ischemic changes. 

 

 Skull and face:  Calvarium and visualized facial bones are intact, without suspicious lesions.  

 

Sinuses:  Visualized sinuses and mastoids are clear.  

 

IMPRESSION:  

 

1. No acute intracranial abnormality.

 

2. Moderate cerebral volume loss and mild chronic white matter small vessel ischemic changes.

 

Reviewed by: Hunter Durán MD on 6/12/2022 11:35 PM PDT

Approved by: Hunter Durán MD on 6/12/2022 11:35 PM PDT

 

 

Station ID:  [id number]

## 2022-06-13 VITALS — SYSTOLIC BLOOD PRESSURE: 171 MMHG | DIASTOLIC BLOOD PRESSURE: 104 MMHG

## 2022-06-13 LAB
CREAT SERPLBLD-SCNC: 1 MG/DL (ref 0.4–1)
GFRSERPLBLD MDRD-ARVRAT: 53 ML/MIN/{1.73_M2} (ref 89–?)

## 2022-06-13 NOTE — XRAY REPORT
PROCEDURE:  Chest 1 View X-Ray

 

INDICATIONS:  weak

 

TECHNIQUE:  One view of the chest was acquired.  

 

COMPARISON:  Chest x-ray 3/17/2020

 

FINDINGS:  

 

Surgical changes and devices:  None.  

 

Lungs and pleura:  No pleural effusions or pneumothorax. Mild increased pulmonary vascularity.

 

Mediastinum:  Mediastinal contours appear normal.  Heart size is enlarged.

 

Bones and chest wall:  No suspicious bony lesions.  Overlying soft tissues appear unremarkable.  

 

IMPRESSION:  

Cardiomegaly with increased pulmonary vascularity suggestive of edema.

 

The above findings are concordant with preliminary report.

 

Reviewed by: Leslie Frances MD on 6/13/2022 12:07 PM PDT

Approved by: Leslie Frances MD on 6/13/2022 12:07 PM PDT

 

 

Station ID:  [id number]

## 2022-06-29 ENCOUNTER — HOSPITAL ENCOUNTER (OUTPATIENT)
Dept: HOSPITAL 76 - LAB.WCP | Age: 82
End: 2022-06-29
Attending: PHYSICIAN ASSISTANT
Payer: MEDICARE

## 2022-06-29 DIAGNOSIS — I48.91: Primary | ICD-10-CM

## 2022-06-29 DIAGNOSIS — Z79.01: ICD-10-CM

## 2022-07-23 ENCOUNTER — HOSPITAL ENCOUNTER (OUTPATIENT)
Dept: HOSPITAL 76 - EMS | Age: 82
Discharge: ANOTHER HOSPITAL | End: 2022-07-23
Payer: MEDICARE

## 2022-07-23 DIAGNOSIS — R11.2: ICD-10-CM

## 2022-07-23 DIAGNOSIS — R51.9: Primary | ICD-10-CM

## 2022-07-23 DIAGNOSIS — I10: ICD-10-CM

## 2022-10-19 ENCOUNTER — HOSPITAL ENCOUNTER (OUTPATIENT)
Dept: HOSPITAL 76 - DI.N | Age: 82
Discharge: HOME | End: 2022-10-19
Attending: GENERAL ACUTE CARE HOSPITAL
Payer: MEDICARE

## 2022-10-19 DIAGNOSIS — Z12.31: Primary | ICD-10-CM

## 2022-10-19 DIAGNOSIS — R92.8: ICD-10-CM

## 2022-10-19 DIAGNOSIS — Z80.3: ICD-10-CM

## 2022-10-20 NOTE — MAMMOGRAPHY REPORT
BILATERAL DIGITAL SCREENING MAMMOGRAM 3D/2D WITH AUGMENTATION: 10/19/2022

CLINICAL: Family history of breast cancer. Routine screening.  

 

Comparison is made to exams dated:  8/12/2021 mammogram, 9/27/2019 mammogram, 8/19/2019 mammogram, 8/
1/2018 mammogram, 5/17/2017 mammogram, and 5/12/2016 mammogram - [facility].  

 

Both breasts are heterogeneously dense, which may obscure small masses (category c / 51-75% glandular
 tissue).  

 

There is a possible new 0.5 cm oval equal density asymmetry in the right breast posterior depth infer
ior region seen on the mediolateral oblique view only.  

No other significant masses, calcifications, or other findings are seen in either breast.  

The bilateral breast implants have a stable appearance.  

IMPRESSION: INCOMPLETE: NEEDS ADDITIONAL IMAGING EVALUATION

The possible new 0.5 cm oval equal density asymmetry in the right breast is indeterminate.  Additiona
l views with possible ultrasound are recommended.  

 

 

 

 

Based on the Tyrer Cuzick model (a risk assessment model) the patients lifetime risk is 2.5% and her
 10 year risk is 0.0%. According to the ACR, ACS, and NCCN guidelines, an annual breast MRI exam doris
g with mammogram is recommended if the patients lifetime risk is 20% or greater.

 

 

This exam was interpreted at Station ID: [id number].  

 

NOTE: For mammograms, a report in lay terms will be sent to the patient. Approximately 15% of breast 
malignancies will not be visualized mammographically. In the management of a palpable breast mass, a 
negative mammogram must not discourage biopsy of a clinically suspicious lesion.

 

Electronically Signed By: Darin Muir M.D.          

aty/:10/19/2022 17:46:46  

 

 

 

ACR BI-RADS Category 0: Incomplete 3340F

PARENCHYMAL PATTERN: (D) - The breast(s) demonstrate(s) heterogeneously dense fibroglandular parmorgan valera.

BI-RADS CATEGORY: (0) - 0

Mammo and US

[id number]

Immediate follow-up

LATERALITY: (R)

## 2022-11-16 ENCOUNTER — HOSPITAL ENCOUNTER (OUTPATIENT)
Dept: HOSPITAL 76 - DI | Age: 82
Discharge: HOME | End: 2022-11-16
Attending: PHYSICIAN ASSISTANT
Payer: MEDICARE

## 2022-11-16 DIAGNOSIS — R92.8: Primary | ICD-10-CM

## 2022-11-22 NOTE — MAMMOGRAPHY REPORT
UNILATERAL RIGHT DIGITAL DIAGNOSTIC MAMMOGRAM 3D/2D WITH SPOT COMPRESSION: 11/16/2022

 

CLINICAL: Patient returns today to evaluate an asymmetry in the right breast.  

 

Comparison is made to exams dated:  10/19/2022 mammogram, 8/12/2021 mammogram, 9/27/2019 mammogram, 8
/19/2019 mammogram, and 8/1/2018 mammogram - [facility].  

 

The right breast is heterogeneously dense, which may obscure small masses (category c / 51-75% glandu
lar tissue).  

 

The possible asymmetry in the right breast posterior depth inferior region seen on the mediolateral o
blique view only is not reproduced and presumably represented superimposed breast tissue.  

No other significant masses or calcifications are seen in the breast.  

 

IMPRESSION: NEGATIVE

There is no mammographic evidence of malignancy. Return to annual mammogram screening schedule is rec
ommended.  

 

Based on the Tyrer Cuzick model (a risk assessment model) the patients lifetime risk is 2.5% and her
 10 year risk is 0.0%. According to the ACR, ACS, and NCCN guidelines, an annual breast MRI exam doris
g with mammogram is recommended if the patients lifetime risk is 20% or greater.

 

 

This exam was interpreted at Station ID: [id number].  

 

NOTE: For mammograms, a report in lay terms will be sent to the patient. Approximately 15% of breast 
malignancies will not be visualized mammographically. In the management of a palpable breast mass, a 
negative mammogram must not discourage biopsy of a clinically suspicious lesion.

 

Electronically Signed By: Yovanny del valle/capri:11/21/2022 13:40:32  

 

 

 

ACR BI-RADS Category 1: Negative 3341F

PARENCHYMAL PATTERN: (D) - The breast(s) demonstrate(s) heterogeneously dense fibroglandular edgardo valera.

BI-RADS CATEGORY: (1) - 1

Mammogram

[id number]

return to screening

LATERALITY: (B)

## 2023-05-10 ENCOUNTER — HOSPITAL ENCOUNTER (OUTPATIENT)
Dept: HOSPITAL 76 - DI | Age: 83
Discharge: HOME | End: 2023-05-10
Attending: PHYSICIAN ASSISTANT
Payer: MEDICARE

## 2023-05-10 DIAGNOSIS — G31.9: ICD-10-CM

## 2023-05-10 DIAGNOSIS — R55: Primary | ICD-10-CM

## 2023-05-10 DIAGNOSIS — I67.82: ICD-10-CM

## 2023-05-10 NOTE — CT REPORT
PROCEDURE:  CT brain without contrast

 

INDICATIONS:  SYNCOPE

 

TECHNIQUE:  

Noncontrast 4.5 mm thick angled axial sections acquired from the foramen magnum to the vertex.  For r
adiation dose reduction, the following was used:  automated exposure control, adjustment of mA and/or
 kV according to patient size.

 

COMPARISON:  None.

 

FINDINGS:  

Image quality:  Excellent.  

 

CSF spaces:  Basal cisterns are patent.  No extra-axial fluid collections.  Ventricles are normal in 
size and shape.  

 

Brain:  No midline shift.  No intracranial masses or hemorrhage.  Gray-white matter interface is norm
al. Atrophy and chronic ischemic change 

 

Skull and face:  Calvarium and visualized facial bones are intact, without suspicious lesions.  

 

Sinuses:  Visualized sinuses and mastoids are clear.  

 

 

IMPRESSION:  

Atrophy and chronic ischemic change without intracranial hemorrhage or mass effect

 

 

 

Reviewed by: Jt Hickey MD on 5/10/2023 3:24 PM AKDT

Approved by: Jt Hickey MD on 5/10/2023 3:24 PM AKDT

 

 

Station ID:  [id number]

## 2023-08-04 ENCOUNTER — HOSPITAL ENCOUNTER (OUTPATIENT)
Dept: HOSPITAL 76 - EMS | Age: 83
Discharge: LEFT BEFORE DISCHARGE | End: 2023-08-04
Payer: MEDICARE

## 2023-08-04 DIAGNOSIS — R11.0: Primary | ICD-10-CM

## 2023-08-04 DIAGNOSIS — I10: ICD-10-CM

## 2023-08-07 ENCOUNTER — HOSPITAL ENCOUNTER (EMERGENCY)
Dept: HOSPITAL 76 - ED | Age: 83
Discharge: HOME | End: 2023-08-07
Payer: MEDICARE

## 2023-08-07 ENCOUNTER — HOSPITAL ENCOUNTER (OUTPATIENT)
Dept: HOSPITAL 76 - EMS | Age: 83
Discharge: ANOTHER HOSPITAL | End: 2023-08-07
Payer: MEDICARE

## 2023-08-07 VITALS — SYSTOLIC BLOOD PRESSURE: 182 MMHG | OXYGEN SATURATION: 95 % | DIASTOLIC BLOOD PRESSURE: 111 MMHG

## 2023-08-07 DIAGNOSIS — E03.9: ICD-10-CM

## 2023-08-07 DIAGNOSIS — I48.91: ICD-10-CM

## 2023-08-07 DIAGNOSIS — I10: Primary | ICD-10-CM

## 2023-08-07 DIAGNOSIS — Z79.899: ICD-10-CM

## 2023-08-07 DIAGNOSIS — R51.9: ICD-10-CM

## 2023-08-07 PROCEDURE — 99284 EMERGENCY DEPT VISIT MOD MDM: CPT

## 2023-08-07 PROCEDURE — 93005 ELECTROCARDIOGRAM TRACING: CPT

## 2023-08-07 PROCEDURE — 99283 EMERGENCY DEPT VISIT LOW MDM: CPT

## 2023-08-07 NOTE — ED PHYSICIAN DOCUMENTATION
History of Present Illness





- Stated complaint


Stated Complaint: HTN





- Chief complaint


Chief Complaint: Cardiac





- History obtained from


History obtained from: Patient, EMS





- Additonal information


Additional information: 


The patient comes to the emergency department via EMS for chief complaint of 

elevated blood pressure.  She states that normally, she thinks she runs around 

the 140s over 80s, but that over the last week, she has been noticing blood 

pressures ranging in the 170s to 180s over 90s to low 100s.  She states she was 

seen by her primary Cintia Patel about a week ago and that her systolic blood 

pressure was 179, but that no discussion was had over the blood pressure and no 

change was made to her regimen.  The patient has been measuring at home and 

finally became concerned and decided to come in.  The patient states that she 

has a mild frontal headache but otherwise is asymptomatic.  No shortness of 

breath, chest pain, neurologic symptoms, nausea, or vomiting.  No edema in her 

lower extremities that is new.  The patient states that she has a history of A-

fib which is well controlled and had an MI 2 years ago.  No other complaints at 

this time.  She says she is on lisinopril and metoprolol but does not know which

kind or what doses.  Review of her records reveals that she is on metoprolol 

succinate 100 mg every evening, but our most recent records do not list 

lisinopril.  The medics have lisinopril listed but do not know the dose.  The 

patient did not bring her medications or list of them with her.








PD PAST MEDICAL HISTORY





- Past Medical History


Cardiovascular: Atrial fibrillation


Respiratory: None


Endocrine/Autoimmune: HyPOthyroidism


GI: None, Hiatal hernia


: None


HEENT: None


Psych: None


Musculoskeletal: Osteoarthritis, Fibromyalgia


Derm: None





- Past Surgical History


Past Surgical History: Yes


General: Colonoscopy, EGD


Ortho: Knee replacement, Spine surgery


/GYN: Hysterectomy


HEENT: Cataracts, Tonsil/Adenoidectomy





- Present Medications


Home Medications: 


                                Ambulatory Orders











 Medication  Instructions  Recorded  Confirmed


 


Estradiol 0.5 mg PO DAILY 10/21/13 07/26/23


 


Cyclobenzaprine [Flexeril] 10 mg PO DAILY PM 12/09/15 07/26/23


 


Lovastatin 20 mg PO DAILY 12/09/15 07/26/23


 


Metoprolol Succinate 100 mg PO QPM 12/09/15 07/26/23


 


Omeprazole [PriLOSEC] 20 mg PO BID 06/21/16 07/26/23


 


Gabapentin 1,200 mg PO QPM 07/31/18 07/26/23


 


Hydrocodone/Acetaminophen [Norco 1 tab PO TID PRN 12/21/20 07/26/23





7.5-325 Tablet]   


 


Furosemide [Lasix] 20 mg PO DAILY 01/15/21 07/26/23


 


Potassium Chloride 10 meq PO DAILY 01/15/21 07/26/23


 


Diphenoxylate/Atropine [Lomotil] 2.5 mg ORAL Q6H PRN #90 tab MDD 8 02/02/22 07/26/23





 tabs  


 


Ondansetron Odt [Zofran Odt] 4 mg ORAL Q8HR PRN 07/06/22 08/02/23


 


Escitalopram [Lexapro] 10 mg PO DAILY 08/07/23 08/07/23


 


Liothyronine [Cytomel] 5 mcg PO QDAC 08/07/23 08/07/23


 


Lisinopril [Zestril] 20 mg PO DAILY 08/07/23 08/07/23


 


Trazodone HCl 150 mg PO HS PRN 08/07/23 08/07/23














- Allergies


Allergies/Adverse Reactions: 


                                    Allergies











Allergy/AdvReac Type Severity Reaction Status Date / Time


 


iron dextran complex AdvReac  Respiratory Verified 08/07/23 14:32














- Social History


Does the pt smoke?: No


Smoking Status: Never smoker


Does the pt drink ETOH?: No


Does the pt have substance abuse?: No





- Immunizations


Immunizations are current?: Yes





- POLST


Patient has POLST: No





PD ED PE NORMAL





- Vitals


Vital signs reviewed: Yes





- General


General: Alert and oriented X 3, No acute distress, Well developed/nourished





- HEENT


HEENT: Atraumatic, PERRL, EOMI, Moist mucous membranes





- Neck


Neck: Supple, no meningeal sign





- Cardiac


Cardiac: RRR, No murmur, Strong equal pulses





- Respiratory


Respiratory: No respiratory distress, Clear bilaterally





- Abdomen


Abdomen: Soft, Non tender, Non distended





- Derm


Derm: Normal color, Warm and dry, No rash





- Extremities


Extremities: No deformity, No edema





- Neuro


Neuro: Alert and oriented X 3, CNs 2-12 intact, No motor deficit, No sensory 

deficit, Normal speech





- Psych


Psych: Normal mood, Normal affect





Results





- Vitals


Vitals: 





                               Vital Signs - 24 hr











  08/07/23





  14:32


 


Temperature 36.2 C L


 


Heart Rate 93


 


Respiratory 18





Rate 


 


Blood Pressure 165/91 H


 


O2 Saturation 97








                                     Oxygen











O2 Source [With Activity]      Room air


 


O2 Source [Without Activity]   Room air


 


O2 Source                      Room air

















- EKG (time done)


  ** 1436


EKG releavant findings:: 


EKG personally interpreted by author of this note. Relevant findings are: 





Rate: Rate (enter#) (91)


Rhythm: Atrial fibrillation


Axis: Normal


QRS: Normal


Ischemia: Normal ST segments


Compare to prior EKG: Old EKG unavailable


Computer interpretation: Agree with computer





PD Medical Decision Making





- ED course


Complexity details: reviewed old records, reviewed results, re-evaluated 

patient, considered differential, d/w patient


ED course: 


The patient was evaluated by myself upon arrival in the emergency department.   

EKG was performed and showed atrial fibrillation with good rate control, which 

the patient has a history of already.  She was largely asymptomatic with the 

exception of a mild frontal headache, and did not show evidence of hypertensive 

emergency or urgency.  Her blood pressure here in the emergency department was 

165/91.  I discussed with the patient that there is no role for emergent blood 

pressure control, but that she should speak with Cintia Patel about whether or 

not adjustments need to be made to her daily regimen.  I have given her a dose 

of metoprolol here. I have attempted to speak with Cintia Patel but she is not 

available.  I discussed with the patient that she may take a half dose of her 

lisinopril if she feels as though her pressure is running high.  She also needs 

to take her lisinopril at a specific time each day as opposed to how she has 

been doing it which she is to take it whenever she thinks of it which can be any

time during the day.  We have discussed checking her blood pressure in the 

morning an hour or 2 after taking her medication and again in the afternoon to 

determine whether she needs extra dosing.  We discussed symptoms of hypertensive

emergency and the usual indications for return.  No further work-up is indicated

here in the ED.








Departure





- Departure


Disposition: 01 Home, Self Care


Clinical Impression: 


Hypertension


Qualifiers:


 Hypertension type: unspecified Qualified Code(s): I10 - Essential (primary) 

hypertension





Condition: Stable


Instructions:  ED HTN Established


Comments: 


I have attempted to talk with your primary care provider Cintia Patel today, but 

she is not in the office.  I have reviewed your most recent records here and 

only the metoprolol dosing is available.  You are on the long-acting form at 100

mg every evening.  It is unclear what your lisinopril dosage is, but given that 

this is the shorter acting of the 2, it is better to use to fine-tune your blood

pressure.  As such, you should continue your normal metoprolol dose every 

evening.  You should have a set time for taking your lisinopril every day, say 8

or 9:00 in the morning.  That way, you can have a set time for checking your 

blood pressure and there will not be variation based on when you took your 

medication.  If you take your lisinopril 8:00 in the morning, you should recheck

your blood pressure at perhaps 10:00.  If it is still running higher than 

160/95, you should take an extra half dose of your lisinopril.  You should then 

check your blood pressure at perhaps 2:00 in the afternoon and see how things 

are going.  If you find that the extra half dose of lisinopril is not bringing 

you down below the 160/95 range, then you may try taking a whole extra dose of 

lisinopril the following day if you run into high blood pressure again above the

desired range.  It is very important that you make the soonest possible 

appointment to follow-up with Cintia Patel about your blood pressure to determine

what you should do on a more long-term basis.  Any adjustments from the 

emergency department are just temporary and are not a substitute for talking 

with your doctor specifically about your outpatient medications.  If you develop

chest pain, shortness of breath, severe headache, or loss of neurologic 

function, you should return to the emergency department immediately.

## 2023-09-07 ENCOUNTER — HOSPITAL ENCOUNTER (OUTPATIENT)
Dept: HOSPITAL 76 - DI | Age: 83
Discharge: HOME | End: 2023-09-07
Attending: PHYSICIAN ASSISTANT
Payer: MEDICARE

## 2023-09-07 DIAGNOSIS — I73.9: Primary | ICD-10-CM

## 2023-09-07 PROCEDURE — 93925 LOWER EXTREMITY STUDY: CPT

## 2023-09-07 NOTE — ULTRASOUND REPORT
PROCEDURE:  Duplex Lwr Ext Arterial Bilat

 

INDICATIONS:  BILATERAL CLAUDICATION

 

TECHNIQUE:  

Color and pulse Doppler interrogation was performed of both lower extremity arterial systems, with im
age documentation.  

 

COMPARISON:  None

 

FINDINGS:  

 

Right lower extremity:  

Common femoral artery:  122 cm/sec, with triphasic flow.  

Deep femoral artery:  57 cm/sec, with biphasic flow.  

Proximal superficial femoral artery:  108 cm/sec, with triphasic flow.  

Mid superficial femoral artery:  99 cm/sec, with triphasic flow.  

Distal superficial femoral artery:  106 cm/sec, with triphasic flow.  

Popliteal artery:  78 cm/sec, with triphasic flow.  

Posterior tibial artery:  111 cm/sec, with triphasic flow.  

Anterior tibial artery/dorsalis pedis:  76 cm/sec, with triphasic flow.  

Gray-scale imaging description:  No focal hemodynamically significant stenosis. 

 

Left lower extremity:  

Common femoral artery:  121 cm/sec, with triphasic flow.  

Deep femoral artery:  80 cm/sec, with triphasic flow.  

Proximal superficial femoral artery:  127 cm/sec, with triphasic flow.  

Mid superficial femoral artery:  99 cm/sec, with triphasic flow.  

Distal superficial femoral artery:  84 cm/sec, with triphasic flow.  

Popliteal artery:  63 cm/sec, with triphasic flow.  

Posterior tibial artery:  81 cm/sec, with triphasic flow.  

Anterior tibial artery/dorsalis pedis:  74 cm/sec, with triphasic flow.  

Gray-scale imaging description:  No focal hemodynamically significant stenosis. 

 

IMPRESSION:  

No focal hemodynamically significant stenosis of the bilateral lower extremities. There are predomina
ntly triphasic waveforms throughout.

 

Reviewed by: Ritu Beasley MD on 9/7/2023 1:42 PM PDT

Approved by: Ritu Beasley MD on 9/7/2023 1:42 PM PDT

 

 

Station ID:  [id number]

## 2024-01-30 ENCOUNTER — HOSPITAL ENCOUNTER (OUTPATIENT)
Dept: HOSPITAL 76 - DI | Age: 84
Discharge: HOME | End: 2024-01-30
Attending: GENERAL ACUTE CARE HOSPITAL
Payer: MEDICARE

## 2024-01-30 DIAGNOSIS — R92.323: ICD-10-CM

## 2024-01-30 DIAGNOSIS — Z12.31: Primary | ICD-10-CM

## 2024-01-30 DIAGNOSIS — Z98.82: ICD-10-CM

## 2024-01-31 NOTE — MAMMOGRAPHY REPORT
BILATERAL DIGITAL SCREENING MAMMOGRAM 3D/2D WITH AUGMENTATION: 1/30/2024

 

CLINICAL: Routine screening.  

 

Comparison is made to exams dated:  11/16/2022 mammogram, 10/19/2022 mammogram, 8/12/2021 mammogram, 
9/27/2019 mammogram, 8/19/2019 mammogram, and 8/1/2018 mammogram - [facility].  

 

There are scattered areas of fibroglandular density in both breasts (category b / 25%-50% glandular t
issue).  

 

Left breast implant is stable.  

No significant masses, calcifications, or other findings are seen in either breast.  

There has been no significant interval change.

 

IMPRESSION: BENIGN

There is no mammographic evidence of malignancy. A 1 year screening mammogram is recommended.  

 

Based on the Tyrer Cuzick model (a risk assessment model) the patient's lifetime risk is 0.6% and her
 10 year risk is 0.0%. According to the ACR, ACS, and NCCN guidelines, an annual breast MRI exam doris
g with mammogram is recommended if the patients lifetime risk is 20% or greater.

 

 

This exam was interpreted at Station ID: [id number].  

 

NOTE: For mammograms, a report in lay terms will be sent to the patient. Approximately 15% of breast 
malignancies will not be visualized mammographically. In the management of a palpable breast mass, a 
negative mammogram must not discourage biopsy of a clinically suspicious lesion.

 

Electronically Signed By: Marimar Wright M.D., PH.D          

eb/penrad:1/30/2024 16:14:30  

 

 

letter sent: No_Letter  

ACR BI-RADS Category 2: Benign Finding(s) 3342F

PARENCHYMAL PATTERN: (A) - The breast(s) demonstrate(s) scattered fibroglandular densities.

BI-RADS CATEGORY: (2) - 2

Mammogram

[id number]

1 year screening

LATERALITY: (B)

## 2024-02-09 ENCOUNTER — HOSPITAL ENCOUNTER (OUTPATIENT)
Dept: HOSPITAL 76 - LAB | Age: 84
Discharge: HOME | End: 2024-02-09
Attending: PHYSICIAN ASSISTANT
Payer: MEDICARE

## 2024-02-09 DIAGNOSIS — J81.1: Primary | ICD-10-CM

## 2024-02-09 LAB
ALBUMIN DIAFP-MCNC: 4.3 G/DL (ref 3.2–5.5)
ALBUMIN/GLOB SERPL: 1.4 {RATIO} (ref 1–2.2)
ALP SERPL-CCNC: 124 IU/L (ref 42–121)
ALT SERPL W P-5'-P-CCNC: 12 IU/L (ref 10–60)
ANION GAP SERPL CALCULATED.4IONS-SCNC: 7 MMOL/L (ref 6–13)
AST SERPL W P-5'-P-CCNC: 18 IU/L (ref 10–42)
BASOPHILS NFR BLD AUTO: 0 10^3/UL (ref 0–0.1)
BASOPHILS NFR BLD AUTO: 0.6 %
BILIRUB BLD-MCNC: 0.8 MG/DL (ref 0.2–1)
BUN SERPL-MCNC: 16 MG/DL (ref 6–20)
CALCIUM UR-MCNC: 9.3 MG/DL (ref 8.5–10.3)
CARDIAC TROPONIN I PNL SERPL HS: 11.9 NG/L (ref 2.3–14.8)
CHLORIDE SERPL-SCNC: 106 MMOL/L (ref 101–111)
CO2 SERPL-SCNC: 27 MMOL/L (ref 21–32)
CREAT SERPLBLD-SCNC: 1.2 MG/DL (ref 0.6–1.3)
EOSINOPHIL # BLD AUTO: 0.1 10^3/UL (ref 0–0.7)
EOSINOPHIL NFR BLD AUTO: 1.7 %
ERYTHROCYTE [DISTWIDTH] IN BLOOD BY AUTOMATED COUNT: 14.3 % (ref 12–15)
GFRSERPLBLD MDRD-ARVRAT: 43 ML/MIN/{1.73_M2} (ref 89–?)
GLOBULIN SER-MCNC: 3.1 G/DL (ref 2.1–4.2)
GLUCOSE SERPL-MCNC: 108 MG/DL (ref 74–104)
HCT VFR BLD AUTO: 40.3 % (ref 37–47)
HGB UR QL STRIP: 12.4 G/DL (ref 12–16)
LYMPHOCYTES # SPEC AUTO: 0.9 10^3/UL (ref 1.5–3.5)
LYMPHOCYTES NFR BLD AUTO: 17.3 %
MCH RBC QN AUTO: 28.8 PG (ref 27–31)
MCHC RBC AUTO-ENTMCNC: 30.8 G/DL (ref 32–36)
MCV RBC AUTO: 93.5 FL (ref 81–99)
MONOCYTES # BLD AUTO: 0.3 10^3/UL (ref 0–1)
MONOCYTES NFR BLD AUTO: 6.4 %
NEUTROPHILS # BLD AUTO: 3.8 10^3/UL (ref 1.5–6.6)
NEUTROPHILS # SNV AUTO: 5.2 X10^3/UL (ref 4.8–10.8)
NEUTROPHILS NFR BLD AUTO: 73.8 %
NRBC # BLD AUTO: 0 /100WBC
NRBC # BLD AUTO: 0 X10^3/UL
PDW BLD AUTO: 10.4 FL (ref 7.9–10.8)
PLATELET # BLD: 201 10^3/UL (ref 130–450)
POTASSIUM SERPL-SCNC: 3.6 MMOL/L (ref 3.5–4.5)
PROT SPEC-MCNC: 7.4 G/DL (ref 6.4–8.9)
RBC MAR: 4.31 10^6/UL (ref 4.2–5.4)
SODIUM SERPLBLD-SCNC: 140 MMOL/L (ref 135–145)

## 2024-02-09 PROCEDURE — 83880 ASSAY OF NATRIURETIC PEPTIDE: CPT

## 2024-02-09 PROCEDURE — 80053 COMPREHEN METABOLIC PANEL: CPT

## 2024-02-09 PROCEDURE — 36415 COLL VENOUS BLD VENIPUNCTURE: CPT

## 2024-02-09 PROCEDURE — 85379 FIBRIN DEGRADATION QUANT: CPT

## 2024-02-09 PROCEDURE — 85025 COMPLETE CBC W/AUTO DIFF WBC: CPT

## 2024-02-09 PROCEDURE — 84484 ASSAY OF TROPONIN QUANT: CPT

## 2024-02-09 NOTE — XRAY REPORT
PROCEDURE:  Chest 2V

 

INDICATIONS:  DYSPNEA ON EXERTION

 

TECHNIQUE:  2 views of the chest were acquired.  

 

COMPARISON:  None.

 

FINDINGS:  

 

Surgical changes and devices:  None.  

 

Lungs and pleura:  No pleural effusions or pneumothorax. Increased pulmonary markings and mild peribr
onchial cuffing. Peripheral interstitial opacities.

 

Mediastinum:  Mediastinal contours appear normal.  Heart size is enlarged.

 

 Bones and chest wall:  No suspicious bony lesions.  Overlying soft tissues appear unremarkable.  

 

 

IMPRESSION:  

 

Moderate pulmonary edema.

 

 

 

Reviewed by: Ministerio Ochoa MD on 2/9/2024 12:18 PM PST

Approved by: Ministerio Ochoa MD on 2/9/2024 12:18 PM [facility], [place]

 

 

Station ID:  [id number]

## 2024-05-16 ENCOUNTER — HOSPITAL ENCOUNTER (OUTPATIENT)
Dept: HOSPITAL 76 - LAB.N | Age: 84
Discharge: HOME | End: 2024-05-16
Attending: PHYSICIAN ASSISTANT
Payer: MEDICARE

## 2024-05-16 DIAGNOSIS — N17.9: ICD-10-CM

## 2024-05-16 DIAGNOSIS — I12.9: Primary | ICD-10-CM

## 2024-05-16 DIAGNOSIS — N18.9: ICD-10-CM

## 2024-05-16 DIAGNOSIS — I48.91: ICD-10-CM

## 2024-05-16 LAB
ANION GAP SERPL CALCULATED.4IONS-SCNC: 7 MMOL/L (ref 6–13)
BASOPHILS NFR BLD AUTO: 0 10^3/UL (ref 0–0.1)
BASOPHILS NFR BLD AUTO: 0.9 %
BUN SERPL-MCNC: 19 MG/DL (ref 6–20)
CALCIUM UR-MCNC: 9.6 MG/DL (ref 8.5–10.3)
CHLORIDE SERPL-SCNC: 101 MMOL/L (ref 101–111)
CO2 SERPL-SCNC: 31 MMOL/L (ref 21–32)
CREAT SERPLBLD-SCNC: 1.5 MG/DL (ref 0.6–1.3)
EOSINOPHIL # BLD AUTO: 0.1 10^3/UL (ref 0–0.7)
EOSINOPHIL NFR BLD AUTO: 1.5 %
ERYTHROCYTE [DISTWIDTH] IN BLOOD BY AUTOMATED COUNT: 14.7 % (ref 12–15)
GFRSERPLBLD MDRD-ARVRAT: 33 ML/MIN/{1.73_M2} (ref 89–?)
GLUCOSE SERPL-MCNC: 90 MG/DL (ref 74–104)
HCT VFR BLD AUTO: 42.7 % (ref 37–47)
HGB UR QL STRIP: 13.7 G/DL (ref 12–16)
LYMPHOCYTES # SPEC AUTO: 1.2 10^3/UL (ref 1.5–3.5)
LYMPHOCYTES NFR BLD AUTO: 26.5 %
MCH RBC QN AUTO: 30.4 PG (ref 27–31)
MCHC RBC AUTO-ENTMCNC: 32.1 G/DL (ref 32–36)
MCV RBC AUTO: 94.9 FL (ref 81–99)
MONOCYTES # BLD AUTO: 0.4 10^3/UL (ref 0–1)
MONOCYTES NFR BLD AUTO: 8.1 %
NEUTROPHILS # BLD AUTO: 3 10^3/UL (ref 1.5–6.6)
NEUTROPHILS # SNV AUTO: 4.7 X10^3/UL (ref 4.8–10.8)
NEUTROPHILS NFR BLD AUTO: 63 %
NRBC # BLD AUTO: 0 /100WBC
NRBC # BLD AUTO: 0 X10^3/UL
PDW BLD AUTO: 10.2 FL (ref 7.9–10.8)
PLATELET # BLD: 294 10^3/UL (ref 130–450)
POTASSIUM SERPL-SCNC: 3.8 MMOL/L (ref 3.5–4.5)
RBC MAR: 4.5 10^6/UL (ref 4.2–5.4)
SODIUM SERPLBLD-SCNC: 139 MMOL/L (ref 135–145)
TSH SERPL-ACNC: 1.36 UIU/ML (ref 0.34–5.6)

## 2024-05-16 PROCEDURE — 36415 COLL VENOUS BLD VENIPUNCTURE: CPT

## 2024-05-16 PROCEDURE — 84443 ASSAY THYROID STIM HORMONE: CPT

## 2024-05-16 PROCEDURE — 80048 BASIC METABOLIC PNL TOTAL CA: CPT

## 2024-05-16 PROCEDURE — 85025 COMPLETE CBC W/AUTO DIFF WBC: CPT

## 2024-09-05 ENCOUNTER — HOSPITAL ENCOUNTER (OUTPATIENT)
Dept: HOSPITAL 76 - EMS | Age: 84
End: 2024-09-05
Payer: MEDICARE

## 2024-09-05 DIAGNOSIS — R30.9: Primary | ICD-10-CM

## 2024-09-05 DIAGNOSIS — R31.9: ICD-10-CM
